# Patient Record
Sex: MALE | Race: WHITE | NOT HISPANIC OR LATINO
[De-identification: names, ages, dates, MRNs, and addresses within clinical notes are randomized per-mention and may not be internally consistent; named-entity substitution may affect disease eponyms.]

---

## 2018-02-06 ENCOUNTER — APPOINTMENT (OUTPATIENT)
Dept: NEUROSURGERY | Facility: CLINIC | Age: 58
End: 2018-02-06
Payer: COMMERCIAL

## 2018-02-06 VITALS
HEART RATE: 69 BPM | HEIGHT: 72 IN | BODY MASS INDEX: 30.88 KG/M2 | DIASTOLIC BLOOD PRESSURE: 85 MMHG | SYSTOLIC BLOOD PRESSURE: 129 MMHG | WEIGHT: 228 LBS

## 2018-02-06 DIAGNOSIS — M54.5 LOW BACK PAIN: ICD-10-CM

## 2018-02-06 DIAGNOSIS — E78.5 HYPERLIPIDEMIA, UNSPECIFIED: ICD-10-CM

## 2018-02-06 DIAGNOSIS — G89.29 LOW BACK PAIN: ICD-10-CM

## 2018-02-06 PROCEDURE — 99204 OFFICE O/P NEW MOD 45 MIN: CPT

## 2018-02-06 RX ORDER — AMLODIPINE BESYLATE 10 MG/1
10 TABLET ORAL DAILY
Refills: 0 | Status: ACTIVE | COMMUNITY

## 2018-02-06 RX ORDER — OLMESARTAN MEDOXOMIL 40 MG/1
40 TABLET, FILM COATED ORAL DAILY
Refills: 0 | Status: ACTIVE | COMMUNITY

## 2018-02-06 RX ORDER — DULOXETINE HYDROCHLORIDE 30 MG/1
30 CAPSULE, DELAYED RELEASE ORAL DAILY
Refills: 0 | Status: ACTIVE | COMMUNITY

## 2018-02-15 ENCOUNTER — OUTPATIENT (OUTPATIENT)
Dept: OUTPATIENT SERVICES | Facility: HOSPITAL | Age: 58
LOS: 1 days | End: 2018-02-15
Payer: COMMERCIAL

## 2018-02-15 VITALS
DIASTOLIC BLOOD PRESSURE: 86 MMHG | HEART RATE: 71 BPM | WEIGHT: 222.01 LBS | RESPIRATION RATE: 16 BRPM | TEMPERATURE: 98 F | HEIGHT: 73 IN | SYSTOLIC BLOOD PRESSURE: 133 MMHG | OXYGEN SATURATION: 98 %

## 2018-02-15 DIAGNOSIS — I10 ESSENTIAL (PRIMARY) HYPERTENSION: ICD-10-CM

## 2018-02-15 DIAGNOSIS — M54.9 DORSALGIA, UNSPECIFIED: ICD-10-CM

## 2018-02-15 DIAGNOSIS — M54.5 LOW BACK PAIN: ICD-10-CM

## 2018-02-15 DIAGNOSIS — Z01.818 ENCOUNTER FOR OTHER PREPROCEDURAL EXAMINATION: ICD-10-CM

## 2018-02-15 DIAGNOSIS — Z98.890 OTHER SPECIFIED POSTPROCEDURAL STATES: Chronic | ICD-10-CM

## 2018-02-15 DIAGNOSIS — D36.10 BENIGN NEOPLASM OF PERIPHERAL NERVES AND AUTONOMIC NERVOUS SYSTEM, UNSPECIFIED: Chronic | ICD-10-CM

## 2018-02-15 LAB
ANION GAP SERPL CALC-SCNC: 15 MMOL/L — SIGNIFICANT CHANGE UP (ref 5–17)
BUN SERPL-MCNC: 21 MG/DL — SIGNIFICANT CHANGE UP (ref 7–23)
CALCIUM SERPL-MCNC: 9.7 MG/DL — SIGNIFICANT CHANGE UP (ref 8.4–10.5)
CHLORIDE SERPL-SCNC: 102 MMOL/L — SIGNIFICANT CHANGE UP (ref 96–108)
CO2 SERPL-SCNC: 23 MMOL/L — SIGNIFICANT CHANGE UP (ref 22–31)
CREAT SERPL-MCNC: 1.11 MG/DL — SIGNIFICANT CHANGE UP (ref 0.5–1.3)
GLUCOSE SERPL-MCNC: 103 MG/DL — HIGH (ref 70–99)
MRSA PCR RESULT.: SIGNIFICANT CHANGE UP
POTASSIUM SERPL-MCNC: 4.2 MMOL/L — SIGNIFICANT CHANGE UP (ref 3.5–5.3)
POTASSIUM SERPL-SCNC: 4.2 MMOL/L — SIGNIFICANT CHANGE UP (ref 3.5–5.3)
S AUREUS DNA NOSE QL NAA+PROBE: SIGNIFICANT CHANGE UP
SODIUM SERPL-SCNC: 140 MMOL/L — SIGNIFICANT CHANGE UP (ref 135–145)

## 2018-02-15 PROCEDURE — G0463: CPT

## 2018-02-15 PROCEDURE — 87640 STAPH A DNA AMP PROBE: CPT

## 2018-02-15 PROCEDURE — 87641 MR-STAPH DNA AMP PROBE: CPT

## 2018-02-15 PROCEDURE — 80048 BASIC METABOLIC PNL TOTAL CA: CPT

## 2018-02-15 RX ORDER — SODIUM CHLORIDE 9 MG/ML
3 INJECTION INTRAMUSCULAR; INTRAVENOUS; SUBCUTANEOUS EVERY 8 HOURS
Qty: 0 | Refills: 0 | Status: DISCONTINUED | OUTPATIENT
Start: 2018-02-23 | End: 2018-02-23

## 2018-02-15 RX ORDER — CEFAZOLIN SODIUM 1 G
2000 VIAL (EA) INJECTION ONCE
Qty: 0 | Refills: 0 | Status: DISCONTINUED | OUTPATIENT
Start: 2018-02-23 | End: 2018-02-24

## 2018-02-15 NOTE — H&P PST ADULT - PSH
History of lumbar laminectomy    History of tonsillectomy    Neuroma  foot removal History of lumbar laminectomy    History of tonsillectomy    Neuroma  foot removal  Status post hip surgery

## 2018-02-15 NOTE — H&P PST ADULT - PROBLEM SELECTOR PLAN 1
planned for Thoracic laminectomy for spinal cord stimulator, pulse generator placement on 2/23/18. ( awaiting clarification from Dr. Rdz's office)  BMP, MRSA send  preprocedure surgical scrub instructions discussed

## 2018-02-15 NOTE — H&P PST ADULT - HISTORY OF PRESENT ILLNESS
57 year old male with PMH of HTN, GERD, chronic lower back pain to left buttocks s/p lumbar laminectomy in the past planned for Thoracic laminectomy for spinal cord stimulator, pulse generator placement on 2/23/18. ( According to patient he is having spinal cord stimulator placement only not laminectomy, called spoke to Vandana at Dr. Rdz's office, she will follow up regarding the procedure clarification and Pending thoracic MRI and will notify PST and patient).

## 2018-02-15 NOTE — H&P PST ADULT - PMH
Essential hypertension    Gastroesophageal reflux disease, esophagitis presence not specified Chronic lower back pain    Essential hypertension    Gastroesophageal reflux disease, esophagitis presence not specified    Spinal stenosis of lumbar region

## 2018-02-16 ENCOUNTER — OTHER (OUTPATIENT)
Age: 58
End: 2018-02-16

## 2018-02-17 ENCOUNTER — FORM ENCOUNTER (OUTPATIENT)
Age: 58
End: 2018-02-17

## 2018-02-18 ENCOUNTER — OUTPATIENT (OUTPATIENT)
Dept: OUTPATIENT SERVICES | Facility: HOSPITAL | Age: 58
LOS: 1 days | End: 2018-02-18
Payer: COMMERCIAL

## 2018-02-18 ENCOUNTER — APPOINTMENT (OUTPATIENT)
Dept: MRI IMAGING | Facility: CLINIC | Age: 58
End: 2018-02-18
Payer: COMMERCIAL

## 2018-02-18 DIAGNOSIS — Z98.890 OTHER SPECIFIED POSTPROCEDURAL STATES: Chronic | ICD-10-CM

## 2018-02-18 DIAGNOSIS — D36.10 BENIGN NEOPLASM OF PERIPHERAL NERVES AND AUTONOMIC NERVOUS SYSTEM, UNSPECIFIED: Chronic | ICD-10-CM

## 2018-02-18 DIAGNOSIS — M54.9 DORSALGIA, UNSPECIFIED: ICD-10-CM

## 2018-02-18 PROCEDURE — 72146 MRI CHEST SPINE W/O DYE: CPT

## 2018-02-18 PROCEDURE — 72146 MRI CHEST SPINE W/O DYE: CPT | Mod: 26

## 2018-02-23 ENCOUNTER — TRANSCRIPTION ENCOUNTER (OUTPATIENT)
Age: 58
End: 2018-02-23

## 2018-02-23 ENCOUNTER — INPATIENT (INPATIENT)
Facility: HOSPITAL | Age: 58
LOS: 1 days | Discharge: HOME | DRG: 518 | End: 2018-02-25
Attending: STUDENT IN AN ORGANIZED HEALTH CARE EDUCATION/TRAINING PROGRAM | Admitting: STUDENT IN AN ORGANIZED HEALTH CARE EDUCATION/TRAINING PROGRAM
Payer: COMMERCIAL

## 2018-02-23 VITALS
SYSTOLIC BLOOD PRESSURE: 136 MMHG | WEIGHT: 214.95 LBS | OXYGEN SATURATION: 97 % | TEMPERATURE: 98 F | RESPIRATION RATE: 18 BRPM | HEIGHT: 73 IN | DIASTOLIC BLOOD PRESSURE: 87 MMHG | HEART RATE: 76 BPM

## 2018-02-23 DIAGNOSIS — Z98.890 OTHER SPECIFIED POSTPROCEDURAL STATES: Chronic | ICD-10-CM

## 2018-02-23 DIAGNOSIS — D36.10 BENIGN NEOPLASM OF PERIPHERAL NERVES AND AUTONOMIC NERVOUS SYSTEM, UNSPECIFIED: Chronic | ICD-10-CM

## 2018-02-23 DIAGNOSIS — M54.9 DORSALGIA, UNSPECIFIED: ICD-10-CM

## 2018-02-23 PROCEDURE — 63655 IMPLANT NEUROELECTRODES: CPT

## 2018-02-23 PROCEDURE — 72131 CT LUMBAR SPINE W/O DYE: CPT | Mod: 26

## 2018-02-23 PROCEDURE — 63685 INS/RPLC SPI NPG/RCVR POCKET: CPT

## 2018-02-23 PROCEDURE — 72128 CT CHEST SPINE W/O DYE: CPT | Mod: 26

## 2018-02-23 RX ORDER — DULOXETINE HYDROCHLORIDE 30 MG/1
1 CAPSULE, DELAYED RELEASE ORAL
Qty: 0 | Refills: 0 | COMMUNITY

## 2018-02-23 RX ORDER — HYDROMORPHONE HYDROCHLORIDE 2 MG/ML
0.5 INJECTION INTRAMUSCULAR; INTRAVENOUS; SUBCUTANEOUS
Qty: 0 | Refills: 0 | Status: DISCONTINUED | OUTPATIENT
Start: 2018-02-23 | End: 2018-02-23

## 2018-02-23 RX ORDER — LOSARTAN POTASSIUM 100 MG/1
50 TABLET, FILM COATED ORAL DAILY
Qty: 0 | Refills: 0 | Status: DISCONTINUED | OUTPATIENT
Start: 2018-02-23 | End: 2018-02-25

## 2018-02-23 RX ORDER — ONDANSETRON 8 MG/1
4 TABLET, FILM COATED ORAL ONCE
Qty: 0 | Refills: 0 | Status: DISCONTINUED | OUTPATIENT
Start: 2018-02-23 | End: 2018-02-23

## 2018-02-23 RX ORDER — DIAZEPAM 5 MG
5 TABLET ORAL ONCE
Qty: 0 | Refills: 0 | Status: DISCONTINUED | OUTPATIENT
Start: 2018-02-23 | End: 2018-02-23

## 2018-02-23 RX ORDER — HYDROMORPHONE HYDROCHLORIDE 2 MG/ML
0.5 INJECTION INTRAMUSCULAR; INTRAVENOUS; SUBCUTANEOUS
Qty: 0 | Refills: 0 | Status: DISCONTINUED | OUTPATIENT
Start: 2018-02-23 | End: 2018-02-24

## 2018-02-23 RX ORDER — SODIUM CHLORIDE 9 MG/ML
1000 INJECTION, SOLUTION INTRAVENOUS
Qty: 0 | Refills: 0 | Status: DISCONTINUED | OUTPATIENT
Start: 2018-02-23 | End: 2018-02-24

## 2018-02-23 RX ORDER — DEXMEDETOMIDINE HYDROCHLORIDE IN 0.9% SODIUM CHLORIDE 4 UG/ML
0.7 INJECTION INTRAVENOUS
Qty: 200 | Refills: 0 | Status: DISCONTINUED | OUTPATIENT
Start: 2018-02-23 | End: 2018-02-24

## 2018-02-23 RX ORDER — NALOXONE HYDROCHLORIDE 4 MG/.1ML
0.1 SPRAY NASAL
Qty: 0 | Refills: 0 | Status: DISCONTINUED | OUTPATIENT
Start: 2018-02-23 | End: 2018-02-25

## 2018-02-23 RX ORDER — ACETAMINOPHEN 500 MG
1000 TABLET ORAL ONCE
Qty: 0 | Refills: 0 | Status: COMPLETED | OUTPATIENT
Start: 2018-02-23 | End: 2018-02-23

## 2018-02-23 RX ORDER — OXYCODONE HYDROCHLORIDE 5 MG/1
10 TABLET ORAL ONCE
Qty: 0 | Refills: 0 | Status: DISCONTINUED | OUTPATIENT
Start: 2018-02-23 | End: 2018-02-23

## 2018-02-23 RX ORDER — HYDROMORPHONE HYDROCHLORIDE 2 MG/ML
30 INJECTION INTRAMUSCULAR; INTRAVENOUS; SUBCUTANEOUS
Qty: 0 | Refills: 0 | Status: DISCONTINUED | OUTPATIENT
Start: 2018-02-23 | End: 2018-02-24

## 2018-02-23 RX ORDER — DEXAMETHASONE 0.5 MG/5ML
4 ELIXIR ORAL ONCE
Qty: 0 | Refills: 0 | Status: DISCONTINUED | OUTPATIENT
Start: 2018-02-23 | End: 2018-02-24

## 2018-02-23 RX ORDER — HYDROCHLOROTHIAZIDE 25 MG
12.5 TABLET ORAL DAILY
Qty: 0 | Refills: 0 | Status: DISCONTINUED | OUTPATIENT
Start: 2018-02-23 | End: 2018-02-25

## 2018-02-23 RX ORDER — DULOXETINE HYDROCHLORIDE 30 MG/1
30 CAPSULE, DELAYED RELEASE ORAL DAILY
Qty: 0 | Refills: 0 | Status: DISCONTINUED | OUTPATIENT
Start: 2018-02-23 | End: 2018-02-24

## 2018-02-23 RX ORDER — DEXAMETHASONE 0.5 MG/5ML
4 ELIXIR ORAL ONCE
Qty: 0 | Refills: 0 | Status: DISCONTINUED | OUTPATIENT
Start: 2018-02-23 | End: 2018-02-23

## 2018-02-23 RX ORDER — MORPHINE SULFATE 50 MG/1
4 CAPSULE, EXTENDED RELEASE ORAL ONCE
Qty: 0 | Refills: 0 | Status: DISCONTINUED | OUTPATIENT
Start: 2018-02-23 | End: 2018-02-23

## 2018-02-23 RX ORDER — AMLODIPINE BESYLATE 2.5 MG/1
10 TABLET ORAL DAILY
Qty: 0 | Refills: 0 | Status: DISCONTINUED | OUTPATIENT
Start: 2018-02-23 | End: 2018-02-25

## 2018-02-23 RX ORDER — OLMESARTAN MEDOXOMIL 5 MG/1
1 TABLET, FILM COATED ORAL
Qty: 0 | Refills: 0 | COMMUNITY

## 2018-02-23 RX ORDER — CYCLOBENZAPRINE HYDROCHLORIDE 10 MG/1
5 TABLET, FILM COATED ORAL ONCE
Qty: 0 | Refills: 0 | Status: COMPLETED | OUTPATIENT
Start: 2018-02-23 | End: 2018-02-23

## 2018-02-23 RX ORDER — AMLODIPINE BESYLATE 2.5 MG/1
1 TABLET ORAL
Qty: 0 | Refills: 0 | COMMUNITY

## 2018-02-23 RX ORDER — PANTOPRAZOLE SODIUM 20 MG/1
40 TABLET, DELAYED RELEASE ORAL
Qty: 0 | Refills: 0 | Status: DISCONTINUED | OUTPATIENT
Start: 2018-02-23 | End: 2018-02-25

## 2018-02-23 RX ORDER — OMEPRAZOLE 10 MG/1
1 CAPSULE, DELAYED RELEASE ORAL
Qty: 0 | Refills: 0 | COMMUNITY

## 2018-02-23 RX ORDER — DIAZEPAM 5 MG
2 TABLET ORAL ONCE
Qty: 0 | Refills: 0 | Status: DISCONTINUED | OUTPATIENT
Start: 2018-02-23 | End: 2018-02-23

## 2018-02-23 RX ORDER — LIDOCAINE HCL 20 MG/ML
0.2 VIAL (ML) INJECTION ONCE
Qty: 0 | Refills: 0 | Status: COMPLETED | OUTPATIENT
Start: 2018-02-23 | End: 2018-02-23

## 2018-02-23 RX ORDER — TRAMADOL HYDROCHLORIDE 50 MG/1
50 TABLET ORAL
Qty: 0 | Refills: 0 | Status: DISCONTINUED | OUTPATIENT
Start: 2018-02-23 | End: 2018-02-25

## 2018-02-23 RX ADMIN — HYDROMORPHONE HYDROCHLORIDE 0.5 MILLIGRAM(S): 2 INJECTION INTRAMUSCULAR; INTRAVENOUS; SUBCUTANEOUS at 11:16

## 2018-02-23 RX ADMIN — MORPHINE SULFATE 4 MILLIGRAM(S): 50 CAPSULE, EXTENDED RELEASE ORAL at 12:06

## 2018-02-23 RX ADMIN — HYDROMORPHONE HYDROCHLORIDE 0.5 MILLIGRAM(S): 2 INJECTION INTRAMUSCULAR; INTRAVENOUS; SUBCUTANEOUS at 13:47

## 2018-02-23 RX ADMIN — SODIUM CHLORIDE 3 MILLILITER(S): 9 INJECTION INTRAMUSCULAR; INTRAVENOUS; SUBCUTANEOUS at 06:17

## 2018-02-23 RX ADMIN — SODIUM CHLORIDE 75 MILLILITER(S): 9 INJECTION, SOLUTION INTRAVENOUS at 11:15

## 2018-02-23 RX ADMIN — HYDROMORPHONE HYDROCHLORIDE 0.5 MILLIGRAM(S): 2 INJECTION INTRAMUSCULAR; INTRAVENOUS; SUBCUTANEOUS at 12:17

## 2018-02-23 RX ADMIN — Medication 400 MILLIGRAM(S): at 17:19

## 2018-02-23 RX ADMIN — Medication 2 MILLIGRAM(S): at 11:09

## 2018-02-23 RX ADMIN — HYDROMORPHONE HYDROCHLORIDE 0.5 MILLIGRAM(S): 2 INJECTION INTRAMUSCULAR; INTRAVENOUS; SUBCUTANEOUS at 11:15

## 2018-02-23 RX ADMIN — HYDROMORPHONE HYDROCHLORIDE 30 MILLILITER(S): 2 INJECTION INTRAMUSCULAR; INTRAVENOUS; SUBCUTANEOUS at 14:30

## 2018-02-23 RX ADMIN — DEXMEDETOMIDINE HYDROCHLORIDE IN 0.9% SODIUM CHLORIDE 17.06 MICROGRAM(S)/KG/HR: 4 INJECTION INTRAVENOUS at 14:00

## 2018-02-23 RX ADMIN — HYDROMORPHONE HYDROCHLORIDE 0.5 MILLIGRAM(S): 2 INJECTION INTRAMUSCULAR; INTRAVENOUS; SUBCUTANEOUS at 12:07

## 2018-02-23 RX ADMIN — Medication 1000 MILLIGRAM(S): at 17:45

## 2018-02-23 RX ADMIN — HYDROMORPHONE HYDROCHLORIDE 0.5 MILLIGRAM(S): 2 INJECTION INTRAMUSCULAR; INTRAVENOUS; SUBCUTANEOUS at 11:50

## 2018-02-23 RX ADMIN — Medication 0.2 MILLILITER(S): at 06:17

## 2018-02-23 RX ADMIN — HYDROMORPHONE HYDROCHLORIDE 0.5 MILLIGRAM(S): 2 INJECTION INTRAMUSCULAR; INTRAVENOUS; SUBCUTANEOUS at 11:30

## 2018-02-23 RX ADMIN — MORPHINE SULFATE 4 MILLIGRAM(S): 50 CAPSULE, EXTENDED RELEASE ORAL at 11:56

## 2018-02-23 RX ADMIN — OXYCODONE HYDROCHLORIDE 10 MILLIGRAM(S): 5 TABLET ORAL at 12:23

## 2018-02-23 RX ADMIN — CYCLOBENZAPRINE HYDROCHLORIDE 5 MILLIGRAM(S): 10 TABLET, FILM COATED ORAL at 15:10

## 2018-02-23 RX ADMIN — Medication 5 MILLIGRAM(S): at 13:27

## 2018-02-23 RX ADMIN — HYDROMORPHONE HYDROCHLORIDE 30 MILLILITER(S): 2 INJECTION INTRAMUSCULAR; INTRAVENOUS; SUBCUTANEOUS at 20:02

## 2018-02-23 RX ADMIN — TRAMADOL HYDROCHLORIDE 50 MILLIGRAM(S): 50 TABLET ORAL at 12:45

## 2018-02-23 RX ADMIN — OXYCODONE HYDROCHLORIDE 10 MILLIGRAM(S): 5 TABLET ORAL at 12:45

## 2018-02-23 RX ADMIN — TRAMADOL HYDROCHLORIDE 50 MILLIGRAM(S): 50 TABLET ORAL at 11:43

## 2018-02-23 RX ADMIN — HYDROMORPHONE HYDROCHLORIDE 0.5 MILLIGRAM(S): 2 INJECTION INTRAMUSCULAR; INTRAVENOUS; SUBCUTANEOUS at 11:37

## 2018-02-23 RX ADMIN — DULOXETINE HYDROCHLORIDE 30 MILLIGRAM(S): 30 CAPSULE, DELAYED RELEASE ORAL at 16:29

## 2018-02-23 NOTE — ASU DISCHARGE PLAN (ADULT/PEDIATRIC). - NOTIFY
Numbness, color, or temperature change to extremity/Bleeding that does not stop/Pain not relieved by Medications/Fever greater than 101/Swelling that continues/Persistent Nausea and Vomiting/Unable to Urinate

## 2018-02-23 NOTE — ASU DISCHARGE PLAN (ADULT/PEDIATRIC). - INSTRUCTIONS
No strenuous activity. No heavy lifting.  You may shower on the first day after you surgery.  No soaking in tub,  You may remove dressing on post op day one. Do not apply any ointments to incision.

## 2018-02-23 NOTE — ASU DISCHARGE PLAN (ADULT/PEDIATRIC). - ITEMS TO FOLLOWUP WITH YOUR PHYSICIAN'S
Call Neurosurgery Dr Rdz for appointment in 1 week for wound check.  Followup with your Private MD in 1-2 weeks.  Return to Emergency Department or contact your Neurosurgeon if any changes in mental status, weakness, numbness or tingling of extremities; difficulty swallowing; drainage or redness of wound, fever; pain in legs; difficulty urinating or constipation.  Donot restart your Aspirin or take any Motrin/NSAIDS until checking with your Neurosurgeon.

## 2018-02-23 NOTE — BRIEF OPERATIVE NOTE - PROCEDURE
<<-----Click on this checkbox to enter Procedure Spinal cord stimulator replacement  02/23/2018    Active  CHRIS

## 2018-02-23 NOTE — PATIENT PROFILE ADULT. - PSH
History of lumbar laminectomy    History of tonsillectomy    Neuroma  foot removal  Status post hip surgery

## 2018-02-23 NOTE — PRE-OP CHECKLIST - VIA
Please follow up as instructed. If you require a blood test for your next appointment please complete 1 week prior to your scheduled appointment.    If you require any imaging study such as a MRI of Prostate, CT scan or Renal Ultrasound complete please call our centralized scheduling department at [phone].    If you require a plain x-ray, KUB (Kidney ureter bladder), NO appointment is required. You should arrive 30 minutes prior to your scheduled time to have this completed in our imaging department.    If you have any questions or concerns regarding your appointment today or your future appointment please contact our office at [phone]. Monday-Friday 8am-5pm    After hours for emergency please contact [phone] and ask to have on call urologist paged.     stretcher

## 2018-02-23 NOTE — ASU DISCHARGE PLAN (ADULT/PEDIATRIC). - MEDICATION SUMMARY - MEDICATIONS TO TAKE
I will START or STAY ON the medications listed below when I get home from the hospital:    traMADol 50 mg oral tablet  -- orally 2 times a day, As Needed  -- Indication: For Chronic lower back pain    olmesartan 40 mg oral tablet  -- 1 tab(s) by mouth once a day  -- Indication: For HTN     Cymbalta 30 mg oral delayed release capsule  -- 1 cap(s) by mouth once a day  -- Indication: For Depression     pravastatin 20 mg oral tablet  -- 1 tab(s) by mouth once a day  -- Indication: For Hyperlipidemia     amLODIPine 10 mg oral tablet  -- 1 tab(s) by mouth once a day  -- Indication: For Hypertension    hydroCHLOROthiazide 12.5 mg oral capsule  -- 1 cap(s) by mouth once a day  -- Indication: For Hypertension    omeprazole 20 mg oral delayed release tablet  -- 1 tab(s) by mouth once a day  -- Indication: For GERD

## 2018-02-23 NOTE — PATIENT PROFILE ADULT. - PMH
Chronic lower back pain    Essential hypertension    Gastroesophageal reflux disease, esophagitis presence not specified    Spinal stenosis of lumbar region

## 2018-02-23 NOTE — CHART NOTE - NSCHARTNOTEFT_GEN_A_CORE
Called to beside, Pt c/o of severe uper thigh pain " The worst he ever had. Neuro exam show no deficits in strength, Given Valium 2mg PO times one. Neurosurg service saw pt , Dr Rdz at bedside ~1125, pt received Dilaudid 0.5mg IVP times 5, Morphine sulfate 4mg Times 1, Neuro surg order Oxycodone times 10 Dr Rdz at bedside again to assess pt. D/W him starting PCA. Pt states only takes tramadol 50 mg that he cuts in half. PCA ordered @1315. Pt wild and sttes he is is in agony that is unrelenting. D/W Dr Bell, DR Rdz and Dr Guzman. Pt started on Precedex gtt @0.7. Pt calmer by 1430. with " Some improvement" Dr Rdz at bedside. Pt appears to have severe quadrcepts spasm. Ordered flexeril 5mg Stat after discussing with DR Rdz and Arabella. Precedex D/C'd at  1500.  By 1530 patient pain improved. Pt ordered for CT of lumbar spine by Neurosurg service. Pt pain now 2 or 3 per patient and feels much better. Pt to CT scan @ 1810. Neurosurg service aware and are primarily following patient.

## 2018-02-24 RX ORDER — ACETAMINOPHEN 500 MG
1000 TABLET ORAL ONCE
Qty: 0 | Refills: 0 | Status: COMPLETED | OUTPATIENT
Start: 2018-02-24 | End: 2018-02-24

## 2018-02-24 RX ORDER — HYDROMORPHONE HYDROCHLORIDE 2 MG/ML
2 INJECTION INTRAMUSCULAR; INTRAVENOUS; SUBCUTANEOUS EVERY 4 HOURS
Qty: 0 | Refills: 0 | Status: DISCONTINUED | OUTPATIENT
Start: 2018-02-24 | End: 2018-02-24

## 2018-02-24 RX ORDER — OXYCODONE AND ACETAMINOPHEN 5; 325 MG/1; MG/1
1 TABLET ORAL EVERY 4 HOURS
Qty: 0 | Refills: 0 | Status: DISCONTINUED | OUTPATIENT
Start: 2018-02-24 | End: 2018-02-24

## 2018-02-24 RX ORDER — KETOROLAC TROMETHAMINE 30 MG/ML
30 SYRINGE (ML) INJECTION EVERY 6 HOURS
Qty: 0 | Refills: 0 | Status: DISCONTINUED | OUTPATIENT
Start: 2018-02-24 | End: 2018-02-25

## 2018-02-24 RX ORDER — DEXAMETHASONE 0.5 MG/5ML
4 ELIXIR ORAL ONCE
Qty: 0 | Refills: 0 | Status: COMPLETED | OUTPATIENT
Start: 2018-02-24 | End: 2018-02-24

## 2018-02-24 RX ORDER — DULOXETINE HYDROCHLORIDE 30 MG/1
30 CAPSULE, DELAYED RELEASE ORAL
Qty: 0 | Refills: 0 | Status: DISCONTINUED | OUTPATIENT
Start: 2018-02-24 | End: 2018-02-25

## 2018-02-24 RX ORDER — CYCLOBENZAPRINE HYDROCHLORIDE 10 MG/1
5 TABLET, FILM COATED ORAL THREE TIMES A DAY
Qty: 0 | Refills: 0 | Status: DISCONTINUED | OUTPATIENT
Start: 2018-02-24 | End: 2018-02-24

## 2018-02-24 RX ORDER — ACETAMINOPHEN 500 MG
1000 TABLET ORAL ONCE
Qty: 0 | Refills: 0 | Status: DISCONTINUED | OUTPATIENT
Start: 2018-02-24 | End: 2018-02-24

## 2018-02-24 RX ORDER — KETOROLAC TROMETHAMINE 30 MG/ML
30 SYRINGE (ML) INJECTION ONCE
Qty: 0 | Refills: 0 | Status: DISCONTINUED | OUTPATIENT
Start: 2018-02-24 | End: 2018-02-24

## 2018-02-24 RX ORDER — CYCLOBENZAPRINE HYDROCHLORIDE 10 MG/1
10 TABLET, FILM COATED ORAL THREE TIMES A DAY
Qty: 0 | Refills: 0 | Status: DISCONTINUED | OUTPATIENT
Start: 2018-02-24 | End: 2018-02-25

## 2018-02-24 RX ORDER — DIAZEPAM 5 MG
5 TABLET ORAL EVERY 6 HOURS
Qty: 0 | Refills: 0 | Status: DISCONTINUED | OUTPATIENT
Start: 2018-02-24 | End: 2018-02-25

## 2018-02-24 RX ORDER — OXYCODONE AND ACETAMINOPHEN 5; 325 MG/1; MG/1
2 TABLET ORAL EVERY 4 HOURS
Qty: 0 | Refills: 0 | Status: DISCONTINUED | OUTPATIENT
Start: 2018-02-24 | End: 2018-02-25

## 2018-02-24 RX ORDER — DEXAMETHASONE 0.5 MG/5ML
6 ELIXIR ORAL EVERY 6 HOURS
Qty: 0 | Refills: 0 | Status: DISCONTINUED | OUTPATIENT
Start: 2018-02-24 | End: 2018-02-25

## 2018-02-24 RX ORDER — HYDROMORPHONE HYDROCHLORIDE 2 MG/ML
1 INJECTION INTRAMUSCULAR; INTRAVENOUS; SUBCUTANEOUS
Qty: 0 | Refills: 0 | Status: DISCONTINUED | OUTPATIENT
Start: 2018-02-24 | End: 2018-02-24

## 2018-02-24 RX ORDER — DIAZEPAM 5 MG
5 TABLET ORAL ONCE
Qty: 0 | Refills: 0 | Status: DISCONTINUED | OUTPATIENT
Start: 2018-02-24 | End: 2018-02-24

## 2018-02-24 RX ORDER — INFLUENZA VIRUS VACCINE 15; 15; 15; 15 UG/.5ML; UG/.5ML; UG/.5ML; UG/.5ML
0.5 SUSPENSION INTRAMUSCULAR ONCE
Qty: 0 | Refills: 0 | Status: DISCONTINUED | OUTPATIENT
Start: 2018-02-24 | End: 2018-02-25

## 2018-02-24 RX ADMIN — OXYCODONE AND ACETAMINOPHEN 1 TABLET(S): 5; 325 TABLET ORAL at 09:48

## 2018-02-24 RX ADMIN — Medication 30 MILLIGRAM(S): at 20:46

## 2018-02-24 RX ADMIN — TRAMADOL HYDROCHLORIDE 50 MILLIGRAM(S): 50 TABLET ORAL at 15:30

## 2018-02-24 RX ADMIN — OXYCODONE AND ACETAMINOPHEN 2 TABLET(S): 5; 325 TABLET ORAL at 19:42

## 2018-02-24 RX ADMIN — Medication 5 MILLIGRAM(S): at 17:25

## 2018-02-24 RX ADMIN — OXYCODONE AND ACETAMINOPHEN 2 TABLET(S): 5; 325 TABLET ORAL at 23:28

## 2018-02-24 RX ADMIN — CYCLOBENZAPRINE HYDROCHLORIDE 5 MILLIGRAM(S): 10 TABLET, FILM COATED ORAL at 01:40

## 2018-02-24 RX ADMIN — HYDROMORPHONE HYDROCHLORIDE 1 MILLIGRAM(S): 2 INJECTION INTRAMUSCULAR; INTRAVENOUS; SUBCUTANEOUS at 22:04

## 2018-02-24 RX ADMIN — OXYCODONE AND ACETAMINOPHEN 2 TABLET(S): 5; 325 TABLET ORAL at 15:27

## 2018-02-24 RX ADMIN — CYCLOBENZAPRINE HYDROCHLORIDE 10 MILLIGRAM(S): 10 TABLET, FILM COATED ORAL at 13:57

## 2018-02-24 RX ADMIN — Medication 1000 MILLIGRAM(S): at 02:37

## 2018-02-24 RX ADMIN — SODIUM CHLORIDE 75 MILLILITER(S): 9 INJECTION, SOLUTION INTRAVENOUS at 06:21

## 2018-02-24 RX ADMIN — Medication 12.5 MILLIGRAM(S): at 06:23

## 2018-02-24 RX ADMIN — Medication 400 MILLIGRAM(S): at 10:50

## 2018-02-24 RX ADMIN — Medication 6 MILLIGRAM(S): at 17:26

## 2018-02-24 RX ADMIN — Medication 6 MILLIGRAM(S): at 23:28

## 2018-02-24 RX ADMIN — Medication 4 MILLIGRAM(S): at 09:47

## 2018-02-24 RX ADMIN — Medication 400 MILLIGRAM(S): at 02:27

## 2018-02-24 RX ADMIN — HYDROMORPHONE HYDROCHLORIDE 1 MILLIGRAM(S): 2 INJECTION INTRAMUSCULAR; INTRAVENOUS; SUBCUTANEOUS at 22:25

## 2018-02-24 RX ADMIN — LOSARTAN POTASSIUM 50 MILLIGRAM(S): 100 TABLET, FILM COATED ORAL at 06:23

## 2018-02-24 RX ADMIN — AMLODIPINE BESYLATE 10 MILLIGRAM(S): 2.5 TABLET ORAL at 06:26

## 2018-02-24 RX ADMIN — HYDROMORPHONE HYDROCHLORIDE 30 MILLILITER(S): 2 INJECTION INTRAMUSCULAR; INTRAVENOUS; SUBCUTANEOUS at 07:47

## 2018-02-24 RX ADMIN — OXYCODONE AND ACETAMINOPHEN 2 TABLET(S): 5; 325 TABLET ORAL at 16:30

## 2018-02-24 RX ADMIN — Medication 30 MILLIGRAM(S): at 16:00

## 2018-02-24 RX ADMIN — HYDROMORPHONE HYDROCHLORIDE 1 MILLIGRAM(S): 2 INJECTION INTRAMUSCULAR; INTRAVENOUS; SUBCUTANEOUS at 20:11

## 2018-02-24 RX ADMIN — Medication 5 MILLIGRAM(S): at 01:11

## 2018-02-24 RX ADMIN — DULOXETINE HYDROCHLORIDE 30 MILLIGRAM(S): 30 CAPSULE, DELAYED RELEASE ORAL at 22:05

## 2018-02-24 RX ADMIN — OXYCODONE AND ACETAMINOPHEN 1 TABLET(S): 5; 325 TABLET ORAL at 10:30

## 2018-02-24 RX ADMIN — OXYCODONE AND ACETAMINOPHEN 2 TABLET(S): 5; 325 TABLET ORAL at 20:11

## 2018-02-24 RX ADMIN — PANTOPRAZOLE SODIUM 40 MILLIGRAM(S): 20 TABLET, DELAYED RELEASE ORAL at 06:23

## 2018-02-24 RX ADMIN — Medication 5 MILLIGRAM(S): at 09:48

## 2018-02-24 RX ADMIN — Medication 30 MILLIGRAM(S): at 15:22

## 2018-02-24 RX ADMIN — Medication 30 MILLIGRAM(S): at 21:15

## 2018-02-24 RX ADMIN — TRAMADOL HYDROCHLORIDE 50 MILLIGRAM(S): 50 TABLET ORAL at 14:39

## 2018-02-24 RX ADMIN — HYDROMORPHONE HYDROCHLORIDE 1 MILLIGRAM(S): 2 INJECTION INTRAMUSCULAR; INTRAVENOUS; SUBCUTANEOUS at 20:30

## 2018-02-24 NOTE — PROVIDER CONTACT NOTE (MEDICATION) - ACTION/TREATMENT ORDERED:
Multiple adjustments made with Pain meds with good effect, temporary relief at times.  Will continue to monitor.  PP/RN

## 2018-02-24 NOTE — PHYSICAL THERAPY INITIAL EVALUATION ADULT - PERTINENT HX OF CURRENT PROBLEM, REHAB EVAL
Pt is a 56yo M presents as POD 1 s/p spinal cord stimulator placement.  pt with complaints of severe L LE pain post op.

## 2018-02-25 ENCOUNTER — TRANSCRIPTION ENCOUNTER (OUTPATIENT)
Age: 58
End: 2018-02-25

## 2018-02-25 VITALS
TEMPERATURE: 98 F | RESPIRATION RATE: 18 BRPM | SYSTOLIC BLOOD PRESSURE: 122 MMHG | HEART RATE: 80 BPM | DIASTOLIC BLOOD PRESSURE: 73 MMHG | OXYGEN SATURATION: 95 %

## 2018-02-25 LAB
ANION GAP SERPL CALC-SCNC: 14 MMOL/L — SIGNIFICANT CHANGE UP (ref 5–17)
BUN SERPL-MCNC: 20 MG/DL — SIGNIFICANT CHANGE UP (ref 7–23)
CALCIUM SERPL-MCNC: 9.7 MG/DL — SIGNIFICANT CHANGE UP (ref 8.4–10.5)
CHLORIDE SERPL-SCNC: 99 MMOL/L — SIGNIFICANT CHANGE UP (ref 96–108)
CK SERPL-CCNC: 235 U/L — HIGH (ref 30–200)
CO2 SERPL-SCNC: 23 MMOL/L — SIGNIFICANT CHANGE UP (ref 22–31)
CREAT SERPL-MCNC: 1.25 MG/DL — SIGNIFICANT CHANGE UP (ref 0.5–1.3)
GLUCOSE SERPL-MCNC: 175 MG/DL — HIGH (ref 70–99)
HCT VFR BLD CALC: 43.9 % — SIGNIFICANT CHANGE UP (ref 39–50)
HGB BLD-MCNC: 15.3 G/DL — SIGNIFICANT CHANGE UP (ref 13–17)
MCHC RBC-ENTMCNC: 31.6 PG — SIGNIFICANT CHANGE UP (ref 27–34)
MCHC RBC-ENTMCNC: 34.8 GM/DL — SIGNIFICANT CHANGE UP (ref 32–36)
MCV RBC AUTO: 90.7 FL — SIGNIFICANT CHANGE UP (ref 80–100)
PLATELET # BLD AUTO: 241 K/UL — SIGNIFICANT CHANGE UP (ref 150–400)
POTASSIUM SERPL-MCNC: 4 MMOL/L — SIGNIFICANT CHANGE UP (ref 3.5–5.3)
POTASSIUM SERPL-SCNC: 4 MMOL/L — SIGNIFICANT CHANGE UP (ref 3.5–5.3)
RBC # BLD: 4.83 M/UL — SIGNIFICANT CHANGE UP (ref 4.2–5.8)
RBC # FLD: 11.7 % — SIGNIFICANT CHANGE UP (ref 10.3–14.5)
SODIUM SERPL-SCNC: 136 MMOL/L — SIGNIFICANT CHANGE UP (ref 135–145)
WBC # BLD: 16.2 K/UL — HIGH (ref 3.8–10.5)
WBC # FLD AUTO: 16.2 K/UL — HIGH (ref 3.8–10.5)

## 2018-02-25 PROCEDURE — 72131 CT LUMBAR SPINE W/O DYE: CPT

## 2018-02-25 PROCEDURE — 85027 COMPLETE CBC AUTOMATED: CPT

## 2018-02-25 PROCEDURE — 76000 FLUOROSCOPY <1 HR PHYS/QHP: CPT

## 2018-02-25 PROCEDURE — 82550 ASSAY OF CK (CPK): CPT

## 2018-02-25 PROCEDURE — C1889: CPT

## 2018-02-25 PROCEDURE — C1822: CPT

## 2018-02-25 PROCEDURE — C1778: CPT

## 2018-02-25 PROCEDURE — 72128 CT CHEST SPINE W/O DYE: CPT

## 2018-02-25 PROCEDURE — 80048 BASIC METABOLIC PNL TOTAL CA: CPT

## 2018-02-25 PROCEDURE — C1787: CPT

## 2018-02-25 RX ORDER — HYDROMORPHONE HYDROCHLORIDE 2 MG/ML
1 INJECTION INTRAMUSCULAR; INTRAVENOUS; SUBCUTANEOUS
Qty: 0 | Refills: 0 | Status: DISCONTINUED | OUTPATIENT
Start: 2018-02-25 | End: 2018-02-25

## 2018-02-25 RX ORDER — LIDOCAINE 4 G/100G
1 CREAM TOPICAL
Qty: 30 | Refills: 0 | OUTPATIENT
Start: 2018-02-25

## 2018-02-25 RX ORDER — TRAMADOL HYDROCHLORIDE 50 MG/1
0 TABLET ORAL
Qty: 0 | Refills: 0 | COMMUNITY

## 2018-02-25 RX ORDER — GABAPENTIN 400 MG/1
1 CAPSULE ORAL
Qty: 90 | Refills: 0 | OUTPATIENT
Start: 2018-02-25 | End: 2018-03-26

## 2018-02-25 RX ORDER — HYDROMORPHONE HYDROCHLORIDE 2 MG/ML
1 INJECTION INTRAMUSCULAR; INTRAVENOUS; SUBCUTANEOUS
Qty: 15 | Refills: 0 | OUTPATIENT
Start: 2018-02-25

## 2018-02-25 RX ADMIN — CYCLOBENZAPRINE HYDROCHLORIDE 10 MILLIGRAM(S): 10 TABLET, FILM COATED ORAL at 05:50

## 2018-02-25 RX ADMIN — AMLODIPINE BESYLATE 10 MILLIGRAM(S): 2.5 TABLET ORAL at 05:50

## 2018-02-25 RX ADMIN — Medication 12.5 MILLIGRAM(S): at 05:50

## 2018-02-25 RX ADMIN — OXYCODONE AND ACETAMINOPHEN 2 TABLET(S): 5; 325 TABLET ORAL at 00:38

## 2018-02-25 RX ADMIN — LOSARTAN POTASSIUM 50 MILLIGRAM(S): 100 TABLET, FILM COATED ORAL at 05:50

## 2018-02-25 RX ADMIN — Medication 30 MILLIGRAM(S): at 02:15

## 2018-02-25 RX ADMIN — Medication 30 MILLIGRAM(S): at 01:42

## 2018-02-25 RX ADMIN — HYDROMORPHONE HYDROCHLORIDE 1 MILLIGRAM(S): 2 INJECTION INTRAMUSCULAR; INTRAVENOUS; SUBCUTANEOUS at 09:00

## 2018-02-25 RX ADMIN — HYDROMORPHONE HYDROCHLORIDE 1 MILLIGRAM(S): 2 INJECTION INTRAMUSCULAR; INTRAVENOUS; SUBCUTANEOUS at 01:38

## 2018-02-25 RX ADMIN — PANTOPRAZOLE SODIUM 40 MILLIGRAM(S): 20 TABLET, DELAYED RELEASE ORAL at 05:51

## 2018-02-25 RX ADMIN — HYDROMORPHONE HYDROCHLORIDE 1 MILLIGRAM(S): 2 INJECTION INTRAMUSCULAR; INTRAVENOUS; SUBCUTANEOUS at 01:53

## 2018-02-25 RX ADMIN — Medication 6 MILLIGRAM(S): at 05:51

## 2018-02-25 RX ADMIN — DULOXETINE HYDROCHLORIDE 30 MILLIGRAM(S): 30 CAPSULE, DELAYED RELEASE ORAL at 05:50

## 2018-02-25 RX ADMIN — OXYCODONE AND ACETAMINOPHEN 2 TABLET(S): 5; 325 TABLET ORAL at 04:00

## 2018-02-25 RX ADMIN — Medication 5 MILLIGRAM(S): at 00:09

## 2018-02-25 RX ADMIN — Medication 30 MILLIGRAM(S): at 06:37

## 2018-02-25 RX ADMIN — HYDROMORPHONE HYDROCHLORIDE 1 MILLIGRAM(S): 2 INJECTION INTRAMUSCULAR; INTRAVENOUS; SUBCUTANEOUS at 08:33

## 2018-02-25 RX ADMIN — HYDROMORPHONE HYDROCHLORIDE 1 MILLIGRAM(S): 2 INJECTION INTRAMUSCULAR; INTRAVENOUS; SUBCUTANEOUS at 05:00

## 2018-02-25 RX ADMIN — OXYCODONE AND ACETAMINOPHEN 2 TABLET(S): 5; 325 TABLET ORAL at 03:36

## 2018-02-25 NOTE — PROGRESS NOTE ADULT - SUBJECTIVE AND OBJECTIVE BOX
Day _1__ of Anesthesia Pain Management Service    SUBJECTIVE:    Pain Scale Score	At rest: _2__ 	With Activity: ___ 	[ ] Refer to charted pain scores    THERAPY:    [ ] IV PCA Morphine		[ ] 5 mg/mL	[ ] 1 mg/mL  [x ] IV PCA Hydromorphone	[ ] 5 mg/mL	[ ] 1 mg/mL  [ ] IV PCA Fentanyl		[ ] 50 micrograms/mL    Demand dose0.25    lockout   6 (minutes) Continuous Rate  0  Total:     Daily      MEDICATIONS  (STANDING):  amLODIPine   Tablet 10 milliGRAM(s) Oral daily  cyclobenzaprine 10 milliGRAM(s) Oral three times a day  DULoxetine 30 milliGRAM(s) Oral daily  hydrochlorothiazide 12.5 milliGRAM(s) Oral daily  losartan 50 milliGRAM(s) Oral daily  pantoprazole    Tablet 40 milliGRAM(s) Oral before breakfast    MEDICATIONS  (PRN):  naloxone Injectable 0.1 milliGRAM(s) IV Push every 3 minutes PRN For ANY of the following changes in patient status:  A. RR LESS THAN 10 breaths per minute, B. Oxygen saturation LESS THAN 90%, C. Sedation score of 6  oxyCODONE    5 mG/acetaminophen 325 mG 1 Tablet(s) Oral every 4 hours PRN Severe Pain (7 - 10)  traMADol 50 milliGRAM(s) Oral two times a day PRN Moderate Pain (4 - 6)      OBJECTIVE:    Sedation Score:	[ x] Alert	[ ] Drowsy 	[ ] Arousable	[ ] Asleep	[ ] Unresponsive    Side Effects:	[x ] None	[ ] Nausea	[ ] Vomiting	[ ] Pruritus  		[ ] Other:    Vital Signs Last 24 Hrs  T(C): 36.8 (24 Feb 2018 10:07), Max: 37.1 (23 Feb 2018 21:55)  T(F): 98.2 (24 Feb 2018 10:07), Max: 98.8 (23 Feb 2018 21:55)  HR: 80 (24 Feb 2018 10:07) (66 - 91)  BP: 111/72 (24 Feb 2018 10:07) (91/51 - 150/86)  BP(mean): 87 (23 Feb 2018 18:30) (66 - 110)  RR: 18 (24 Feb 2018 10:07) (18 - 24)  SpO2: 96% (24 Feb 2018 10:07) (92% - 100%)    ASSESSMENT/ PLAN    Therapy to  be:	[ ] Continue   [x ] Discontinued   [x ] Change to prn Analgesics    Documentation and Verification of current medications:   [X] Done	[ ] Not done, not elligible    Comments:
SUBJECTIVE: Pt seen and examined, pt seen at bedside with NS resident and Dr Rdz. Pt reports some improvement with severe anterior thigh pain. He wants to go home. SCS turned on at bedside and pt reports some relief.     OVERNIGHT EVENTS: pain control    Vital Signs Last 24 Hrs  T(C): 36.6 (25 Feb 2018 05:45), Max: 37.1 (24 Feb 2018 21:22)  T(F): 97.8 (25 Feb 2018 05:45), Max: 98.8 (24 Feb 2018 21:22)  HR: 80 (25 Feb 2018 05:45) (79 - 86)  BP: 122/73 (25 Feb 2018 05:45) (111/62 - 159/76)  BP(mean): --  RR: 18 (25 Feb 2018 05:45) (18 - 18)  SpO2: 95% (25 Feb 2018 05:45) (93% - 96%)    PHYSICAL EXAM:    General: No Acute Distress     Neurological: Awake, alert oriented to person, place and time, Following Commands, PERRL, EOMI, Face Symmetrical, Speech Fluent, Moving all extremities, Muscle Strength normal in all four extremities, No Drift, Sensation to Light Touch Intact    Pulmonary: Clear to Auscultation, No Rales, No Rhonchi, No Wheezes     Cardiovascular: S1, S2, Regular Rate and Rhythm     Gastrointestinal: Soft, Nontender, Nondistended     Extremities: No calf tenderness     Incision: incisions x 2 c/d/i    LABS:                        15.3   16.2  )-----------( 241      ( 25 Feb 2018 07:05 )             43.9    02-25    136  |  99  |  20  ----------------------------<  175<H>  4.0   |  23  |  1.25    Ca    9.7      25 Feb 2018 07:05          02-24 @ 07:01  -  02-25 @ 07:00  --------------------------------------------------------  IN: 990 mL / OUT: 1800 mL / NET: -810 mL      DRAINS: none    MEDICATIONS:  Antibiotics:    Neuro:  cyclobenzaprine 10 milliGRAM(s) Oral three times a day  diazepam    Tablet 5 milliGRAM(s) Oral every 6 hours PRN spasms  DULoxetine 30 milliGRAM(s) Oral two times a day  ketorolac   Injectable 30 milliGRAM(s) IV Push every 6 hours  oxyCODONE    5 mG/acetaminophen 325 mG 2 Tablet(s) Oral every 4 hours PRN Severe Pain (7 - 10)  traMADol 50 milliGRAM(s) Oral two times a day PRN Moderate Pain (4 - 6)    Cardiac:  amLODIPine   Tablet 10 milliGRAM(s) Oral daily  hydrochlorothiazide 12.5 milliGRAM(s) Oral daily  losartan 50 milliGRAM(s) Oral daily    Pulm:    GI/:  pantoprazole    Tablet 40 milliGRAM(s) Oral before breakfast    Other:   dexamethasone  Injectable 6 milliGRAM(s) IV Push every 6 hours  influenza   Vaccine 0.5 milliLiter(s) IntraMuscular once  naloxone Injectable 0.1 milliGRAM(s) IV Push every 3 minutes PRN For ANY of the following changes in patient status:  A. RR LESS THAN 10 breaths per minute, B. Oxygen saturation LESS THAN 90%, C. Sedation score of 6    DIET: [x] Regular [] CCD [] Renal [] Puree [] Dysphagia [] Tube Feeds:     IMAGING:
CHIEF COMPLAINT: patient c/o pain in bilateral upper thighs, denies bowel/ bladder dysfunction, states PCA not helping      Vital Signs Last 24 Hrs  T(C): 36.7 (24 Feb 2018 13:41), Max: 37.1 (23 Feb 2018 21:55)  T(F): 98.1 (24 Feb 2018 13:41), Max: 98.8 (23 Feb 2018 21:55)  HR: 79 (24 Feb 2018 13:41) (67 - 91)  BP: 125/76 (24 Feb 2018 13:41) (111/72 - 150/86)  BP(mean): 87 (23 Feb 2018 18:30) (83 - 87)  RR: 18 (24 Feb 2018 13:41) (18 - 18)  SpO2: 94% (24 Feb 2018 13:41) (92% - 99%)    PHYSICAL EXAM:    Constitutional: No Acute Distress     Neurological: AOx3, Following Commands, Moving all Extremities     Motor exam:          Upper extremity                         Delt     Bicep     Tricep    HG                                                 R         5/5        5/5        5/5       5/5                                               L          5/5        5/5        5/5       5/5          Lower extremity                        HF         KF        KE       DF         PF                                                  R        5/5        5/5        5/5       5/5         5/5                                               L         5/5        5/5       5/5       5/5          5/5                                                 Sensation: [x] intact to light touch  [] decreased:     Pulmonary: Clear to Auscultation, No rales, No rhonchi, No wheezes     Cardiovascular: S1, S2, Regular rate and rhythm     Gastrointestinal: Soft, Non-tender, Non-distended     Extremities: No calf tenderness     Incision: c/d/i    LABS:     MEDICATIONS:    Neuro:  cyclobenzaprine 10 milliGRAM(s) Oral three times a day  DULoxetine 30 milliGRAM(s) Oral daily  oxyCODONE    5 mG/acetaminophen 325 mG 2 Tablet(s) Oral every 4 hours PRN Severe Pain (7 - 10)  traMADol 50 milliGRAM(s) Oral two times a day PRN Moderate Pain (4 - 6)    Cardiac:  amLODIPine   Tablet 10 milliGRAM(s) Oral daily  hydrochlorothiazide 12.5 milliGRAM(s) Oral daily  losartan 50 milliGRAM(s) Oral daily    GI/:  pantoprazole    Tablet 40 milliGRAM(s) Oral before breakfast    Other:   naloxone Injectable 0.1 milliGRAM(s) IV Push every 3 minutes PRN For ANY of the following changes in patient status:  A. RR LESS THAN 10 breaths per minute, B. Oxygen saturation LESS THAN 90%, C. Sedation score of 6    DIET: [x] Regular [] CCD [] Renal [] Puree [] Dysphagia [] Tube Feeds:     IMAGING:   < from: CT Lumbar Spine No Cont (02.23.18 @ 20:26) >  IMPRESSION: Evidence of lumbosacral transitional anatomy with   lumbarization of the S1 vertebral body.  No acute fractures or dislocations.  Old right transverse process fracture at L4.  Mild multilevel degenerative changes.  < end of copied text >

## 2018-02-25 NOTE — DISCHARGE NOTE ADULT - ADDITIONAL INSTRUCTIONS
Any sign of fever, chills, discharge from incisions or increased pain not relieved with pain medication contact Dr Rdz or return to ER

## 2018-02-25 NOTE — DISCHARGE NOTE ADULT - HOSPITAL COURSE
57 year old male with PMH of HTN, GERD, chronic lower back pain to left buttocks s/p lumbar laminectomy in the past planned for Thoracic laminectomy for spinal cord stimulator, pulse generator placement on 2/23/18. ( According to patient he is having spinal cord stimulator placement only not laminectomy, called spoke to Vandana at Dr. Rdz's office, she will follow up regarding the procedure clarification and Pending thoracic MRI and will notify PST and patient).     On 2/23/18 pt went to the OR for placement of Spinal Cord Stimulator and IPG. Post operatively pt experienced severe anterior thigh pain and remained hospitalized until pain was controlled.  Pain improved with steroids, narcotics, NSAIDS and muscle relaxants. Pt was evaluated by Physical Therapy and no PT needs were indicated. Pt is medically and neurologically stable for discharge to home.

## 2018-02-25 NOTE — DISCHARGE NOTE ADULT - CARE PLAN
Principal Discharge DX:	Chronic lower back pain  Goal:	2/23/18 s/p T8-T9 placement of Spinal Cord Stimulator and IPG  Assessment and plan of treatment:	Follow up with Dr Rdz on Tuesday. Call office to make appointment. You may shower. Pat incisions dry with towel after shower. No rubbing or scrubbing of incisions. Do not apply ointments or creams to incisions. Keep incisions clean and dry.  Secondary Diagnosis:	Spinal cord stimulator status  Goal:	stable  Assessment and plan of treatment:	Continue pain medications as prescribed  Secondary Diagnosis:	Essential hypertension  Goal:	stable  Assessment and plan of treatment:	continue current medications

## 2018-02-25 NOTE — PROGRESS NOTE ADULT - ASSESSMENT
HPI:  57 year old male with PMH of HTN, GERD, chronic lower back pain to left buttocks s/p lumbar laminectomy in the past planned for Thoracic laminectomy for spinal cord stimulator, pulse generator placement on 2/23/18. ( According to patient he is having spinal cord stimulator placement only not laminectomy, called spoke to Vandana at Dr. Rdz's office, she will follow up regarding the procedure clarification and Pending thoracic MRI and will notify PST and patient). (15 Feb 2018 09:20)    PAST MEDICAL & SURGICAL HISTORY:  Spinal stenosis of lumbar region  Chronic lower back pain  Gastroesophageal reflux disease, esophagitis presence not specified  Essential hypertension  Status post hip surgery  Neuroma: foot removal  History of tonsillectomy  History of lumbar laminectomy    PROCEDURE: 2/23/18 Placement of T8-9 SCS and IPG.      PLAN:  Neuro: flexeril increased, valium prn, cont tramadol  Respiratory: patient instructed on incentive spirometer           DVT ppx: [] SQH [] SQL and venodynes bilaterally, no chemoprophylaxis as patient ambulating  GI: bowel regimen  PT/OT: no skilled needs  trial of decadron and toradol x1    case discussed with Dr Rdz  [facility] # [id number]
57 year old male with PMH of HTN, GERD, chronic lower back pain to left buttocks s/p lumbar laminectomy in the past planned for Thoracic laminectomy for spinal cord stimulator, pulse generator placement on 2/23/18. ( According to patient he is having spinal cord stimulator placement only not laminectomy, called spoke to Vandana at Dr. Rdz's office, she will follow up regarding the procedure clarification and Pending thoracic MRI and will notify PST and patient). (15 Feb 2018 09:20)    PROCEDURE: 2/23 s/p Spinal cord stimulator and IPG placement     POD#2    PLAN:  Neuro:   - pt reports some relief of anterior thigh pain/burning with steroids  - pain control- discharge home with lidoderm patch, neurontin, medrol dosepak  - continue cymbalta  Respiratory:   - encouraged incentive spirometry  CV:  -HTN- continue losartan, HCTZ, amlodipine   DVT ppx:   - venodynes, ambulation  PT/OT: no needs  Discussed with Dr Rdz      [facility] # [id number]

## 2018-02-25 NOTE — DISCHARGE NOTE ADULT - CARE PROVIDER_API CALL
Cullen Rdz (MD), Neurosurgery  General  [address] [address]  Phone: [phone]  Fax: [phone]    Primary Care Physician,   Phone: (   )    -  Fax: (   )    -

## 2018-02-25 NOTE — DISCHARGE NOTE ADULT - PLAN OF CARE
2/23/18 s/p T8-T9 placement of Spinal Cord Stimulator and IPG Follow up with Dr Rdz on Tuesday. Call office to make appointment. You may shower. Pat incisions dry with towel after shower. No rubbing or scrubbing of incisions. Do not apply ointments or creams to incisions. Keep incisions clean and dry. stable Continue pain medications as prescribed continue current medications

## 2018-02-25 NOTE — DISCHARGE NOTE ADULT - MEDICATION SUMMARY - MEDICATIONS TO TAKE
I will START or STAY ON the medications listed below when I get home from the hospital:    methylPREDNISolone 4 mg oral tablet  -- Use as directed  -- It is very important that you take or use this exactly as directed.  Do not skip doses or discontinue unless directed by your doctor.  Obtain medical advice before taking any non-prescription drugs as some may affect the action of this medication.  Take with food or milk.    -- Indication: For post op pain    Dilaudid 2 mg oral tablet  -- 1 tab(s) by mouth every 4 hours, As Needed -for severe pain MDD:3  -- Caution federal law prohibits the transfer of this drug to any person other  than the person for whom it was prescribed.  May cause drowsiness.  Alcohol may intensify this effect.  Use care when operating dangerous machinery.  This prescription cannot be refilled.  Using more of this medication than prescribed may cause serious breathing problems.    -- Indication: For pain    olmesartan 40 mg oral tablet  -- 1 tab(s) by mouth once a day  -- Indication: For Essential hypertension    gabapentin 300 mg oral capsule  -- 1 cap(s) by mouth 3 times a day   -- It is very important that you take or use this exactly as directed.  Do not skip doses or discontinue unless directed by your doctor.  May cause drowsiness.  Alcohol may intensify this effect.  Use care when operating dangerous machinery.    -- Indication: For Nerve pain    Cymbalta 30 mg oral delayed release capsule  -- 1 cap(s) by mouth once a day  -- Indication: For Chronic pain/anxiety    pravastatin 20 mg oral tablet  -- 1 tab(s) by mouth once a day  -- Indication: For Cholesterol    amLODIPine 10 mg oral tablet  -- 1 tab(s) by mouth once a day  -- Indication: For Essential hypertension    Lidoderm 5% topical film  -- Apply on skin to affected area every 12 hours, on 12 hours off 12 hours MDD:1  -- For external use only.  Remove old patch prior to applying a new patch.    -- Indication: For thigh pain    hydroCHLOROthiazide 12.5 mg oral capsule  -- 1 cap(s) by mouth once a day  -- Indication: For Essential hypertension    omeprazole 20 mg oral delayed release tablet  -- 1 tab(s) by mouth once a day  -- Indication: For reflux

## 2018-02-25 NOTE — DISCHARGE NOTE ADULT - NS AS ACTIVITY OBS
No Heavy lifting/straining/Do not make important decisions/Walking-Indoors allowed/Showering allowed/Walking-Outdoors allowed/Stairs allowed/Do not drive or operate machinery

## 2018-02-25 NOTE — DISCHARGE NOTE ADULT - PATIENT PORTAL LINK FT
You can access the [facility], offered by [facility], by registering with the following website: [email or web address]

## 2018-02-27 ENCOUNTER — APPOINTMENT (OUTPATIENT)
Dept: NEUROSURGERY | Facility: CLINIC | Age: 58
End: 2018-02-27
Payer: COMMERCIAL

## 2018-02-27 PROCEDURE — 99024 POSTOP FOLLOW-UP VISIT: CPT

## 2018-03-09 ENCOUNTER — RX RENEWAL (OUTPATIENT)
Age: 58
End: 2018-03-09

## 2018-03-09 DIAGNOSIS — G89.18 OTHER ACUTE POSTPROCEDURAL PAIN: ICD-10-CM

## 2018-03-09 RX ORDER — GABAPENTIN 800 MG/1
800 TABLET, COATED ORAL 3 TIMES DAILY
Qty: 90 | Refills: 3 | Status: ACTIVE | COMMUNITY
Start: 2018-03-09 | End: 1900-01-01

## 2018-03-09 RX ORDER — TRAMADOL HYDROCHLORIDE 50 MG/1
50 TABLET, COATED ORAL
Qty: 24 | Refills: 0 | Status: ACTIVE | COMMUNITY
Start: 2018-03-09 | End: 1900-01-01

## 2018-04-10 ENCOUNTER — APPOINTMENT (OUTPATIENT)
Dept: NEUROSURGERY | Facility: CLINIC | Age: 58
End: 2018-04-10
Payer: COMMERCIAL

## 2018-04-10 VITALS
WEIGHT: 228 LBS | HEIGHT: 72 IN | DIASTOLIC BLOOD PRESSURE: 86 MMHG | HEART RATE: 62 BPM | SYSTOLIC BLOOD PRESSURE: 125 MMHG | BODY MASS INDEX: 30.88 KG/M2

## 2018-04-10 PROCEDURE — 99024 POSTOP FOLLOW-UP VISIT: CPT

## 2018-04-11 RX ORDER — GABAPENTIN 300 MG/1
300 CAPSULE ORAL
Qty: 90 | Refills: 0 | Status: ACTIVE | COMMUNITY
Start: 2018-02-25

## 2018-07-10 ENCOUNTER — APPOINTMENT (OUTPATIENT)
Dept: NEUROSURGERY | Facility: CLINIC | Age: 58
End: 2018-07-10

## 2018-07-23 PROBLEM — I10 ESSENTIAL (PRIMARY) HYPERTENSION: Chronic | Status: ACTIVE | Noted: 2018-02-15

## 2018-07-23 PROBLEM — M48.061 SPINAL STENOSIS, LUMBAR REGION WITHOUT NEUROGENIC CLAUDICATION: Chronic | Status: ACTIVE | Noted: 2018-02-15

## 2018-07-23 PROBLEM — M54.5 LOW BACK PAIN: Chronic | Status: ACTIVE | Noted: 2018-02-15

## 2018-07-23 PROBLEM — K21.9 GASTRO-ESOPHAGEAL REFLUX DISEASE WITHOUT ESOPHAGITIS: Chronic | Status: ACTIVE | Noted: 2018-02-15

## 2018-07-24 ENCOUNTER — APPOINTMENT (OUTPATIENT)
Dept: NEUROSURGERY | Facility: CLINIC | Age: 58
End: 2018-07-24
Payer: COMMERCIAL

## 2018-07-24 VITALS
BODY MASS INDEX: 29.8 KG/M2 | DIASTOLIC BLOOD PRESSURE: 80 MMHG | SYSTOLIC BLOOD PRESSURE: 130 MMHG | HEIGHT: 72 IN | HEART RATE: 64 BPM | WEIGHT: 220 LBS

## 2018-07-24 PROCEDURE — 99214 OFFICE O/P EST MOD 30 MIN: CPT

## 2019-11-07 NOTE — BRIEF OPERATIVE NOTE - PRE-OP
<<-----Click on this checkbox to enter Pre-Op Dx Where Do You Want The Question To Include Opioid Counseling Located?: Case Summary Tab

## 2020-05-08 DIAGNOSIS — Z78.9 OTHER SPECIFIED HEALTH STATUS: ICD-10-CM

## 2020-05-13 ENCOUNTER — APPOINTMENT (OUTPATIENT)
Dept: SPINE | Facility: CLINIC | Age: 60
End: 2020-05-13
Payer: COMMERCIAL

## 2020-05-13 VITALS
OXYGEN SATURATION: 98 % | TEMPERATURE: 98.6 F | WEIGHT: 220 LBS | RESPIRATION RATE: 16 BRPM | BODY MASS INDEX: 29.8 KG/M2 | HEIGHT: 72 IN | HEART RATE: 68 BPM | SYSTOLIC BLOOD PRESSURE: 136 MMHG | DIASTOLIC BLOOD PRESSURE: 84 MMHG

## 2020-05-13 DIAGNOSIS — M54.9 DORSALGIA, UNSPECIFIED: ICD-10-CM

## 2020-05-13 PROCEDURE — 99214 OFFICE O/P EST MOD 30 MIN: CPT

## 2020-05-13 NOTE — HISTORY OF PRESENT ILLNESS
[Other: ___] : [unfilled] [de-identified] : PETER CORCORAN is a 59 year  old gentleman who years ago had left hip pain and had physical therapy with little help.  He then underwent a laminectomy by Dr. Nova and stated that he awoke with pain in his legs.  He was seen by Dr. Fernie Barrera and had a [employer] SCS trial which was not helpful because he sensed too much vibration.  Three years later he underwent a trial and placement of a Nevro spinal cord stimulator with Dr. Cullen Rdz.  The patient stated that since placement of the SCS he has been experiencing burning type pain in the front of his thighs and left great toe tingling.  The patient has had physical therapy, epidural injections, chiropractics and acupuncture with little relief.\par  [FreeTextEntry1] : Anterior thigh burning pain.

## 2020-05-13 NOTE — REVIEW OF SYSTEMS
[Arthralgias] : arthralgias [Joint Pain] : joint pain [As Noted in HPI] : as noted in HPI [Negative] : Heme/Lymph [de-identified] : yaakov

## 2020-05-13 NOTE — ASSESSMENT
[FreeTextEntry1] : Please see associated dictation by Dr. Jose Alejandro Pope m.D.  It was discussed that the patient may benefit from an L3-L4, l4-L5 decompression, bone graft and spacer and posterior hardware and screws.  The patient does not want to pursue surgery at this time.

## 2020-05-13 NOTE — RESULTS/DATA
[FreeTextEntry1] : The SCS implant is at T8 T9.  There is foraminal stenosis at L3-L4 impinging on the nerve roots and to a lesser degree at L4-L5.

## 2020-05-13 NOTE — REASON FOR VISIT
[New Patient Visit] : a new patient visit [Referred By: _________] : Patient was referred by CECIL [FreeTextEntry1] : The patient c/o bilateral frontal thigh pain.

## 2020-05-13 NOTE — PHYSICAL EXAM
[Person] : oriented to person [Place] : oriented to place [Time] : oriented to time [Short Term Intact] : short term memory intact [Remote Intact] : remote memory intact [Span Intact] : the attention span was normal [Concentration Intact] : normal concentrating ability [Fluency] : fluency intact [Current Events] : adequate knowledge of current events [Comprehension] : comprehension intact [Vocabulary] : adequate range of vocabulary [Past History] : adequate knowledge of personal past history [Cranial Nerves Oculomotor (III)] : extraocular motion intact [Cranial Nerves Optic (II)] : visual acuity intact bilaterally,  pupils equal round and reactive to light [Cranial Nerves Trigeminal (V)] : facial sensation intact symmetrically [Cranial Nerves Facial (VII)] : face symmetrical [Cranial Nerves Vestibulocochlear (VIII)] : hearing was intact bilaterally [Cranial Nerves Glossopharyngeal (IX)] : tongue and palate midline [Cranial Nerves Accessory (XI - Cranial And Spinal)] : head turning and shoulder shrug symmetric [Cranial Nerves Hypoglossal (XII)] : there was no tongue deviation with protrusion [Motor Tone] : muscle tone was normal in all four extremities [Motor Strength] : muscle strength was normal in all four extremities [No Muscle Atrophy] : normal bulk in all four extremities [Sensation Tactile Decrease] : light touch was intact [5] : S1 ankle flexors 5/5 [Balance] : balance was intact [Past-pointing] : there was no past-pointing [Abnormal Walk] : normal gait [Tremor] : no tremor present [2+] : Triceps left 2+ [1+] : Patella left 1+

## 2020-05-13 NOTE — DATA REVIEWED
[de-identified] : Thoracic done at [facility] on 05/02/2019 [de-identified] : Lumbar spine w/o contrast at [facility] 01/08/2020 [de-identified] : Complete spine myelogram.

## 2021-04-13 ENCOUNTER — APPOINTMENT (OUTPATIENT)
Dept: DISASTER EMERGENCY | Facility: OTHER | Age: 61
End: 2021-04-13
Payer: COMMERCIAL

## 2021-04-13 PROCEDURE — 0012A: CPT

## 2021-12-13 NOTE — ASU DISCHARGE PLAN (ADULT/PEDIATRIC). - BATHING
shower only Bill For Surgical Tray: no Billing Type: Third-Party Bill Expected Date Of Service: 12/13/2021

## 2022-05-19 ENCOUNTER — APPOINTMENT (OUTPATIENT)
Dept: PAIN MANAGEMENT | Facility: CLINIC | Age: 62
End: 2022-05-19
Payer: COMMERCIAL

## 2022-05-19 VITALS — HEIGHT: 72 IN | WEIGHT: 223 LBS | BODY MASS INDEX: 30.2 KG/M2

## 2022-05-19 DIAGNOSIS — I10 ESSENTIAL (PRIMARY) HYPERTENSION: ICD-10-CM

## 2022-05-19 PROCEDURE — 99214 OFFICE O/P EST MOD 30 MIN: CPT | Mod: 25

## 2022-05-19 PROCEDURE — 95972 ALYS CPLX SP/PN NPGT W/PRGRM: CPT

## 2022-05-19 NOTE — DISCUSSION/SUMMARY
[Medication Risks Reviewed] : Medication risks reviewed [de-identified] : Discussion\par After discussing various treatment options with the patient including but not limited to oral medications, physical therapy, exercise,\par modalities as well as interventional spinal injections, we have decided with the following plan\par \par Medications Renewal\par The patient is stable on current pain medication with analgesia and without notable side effects or any obvious aberrant\par behaviors exhibited.\par \par There is clinically meaningful improvement in pain and function that outweighs risks to patient safety. I have discussed risks and\par realistic benefits of opioid therapy and patient and clinician responsibilities for managing therapy. Pain agreement has been\par signed.\par \par Will renew meds today\par \par Follow up in 4-6 weeks\par Follow up in 4-6 weeks or sooner if there are any problems.\par \par Conservative Care\par Continue Home exercises, stretching, activity modification, physical therapy, and conservative care.\par \par  \par I have consulted the  Registry for the purpose of reviewing the patient's controlled substance\par \par  Fall Risk Counseling\par The patient was provided Fall Risk counseling due to the prescribed medication(s). The patient is aware of the risks associated with\par their medication(s)\par

## 2022-05-19 NOTE — HISTORY OF PRESENT ILLNESS
[Lower back] : lower back [2] : 2 [0] : 0 [Dull/Aching] : dull/aching [Constant] : constant [Household chores] : household chores [Leisure] : leisure [] : no [FreeTextEntry9] : stretching [de-identified] : twisting

## 2022-05-19 NOTE — PHYSICAL EXAM
[Normal Coordination] : normal coordination [Normal DTR UE/LE] : normal DTR UE/LE  [Normal Sensation] : normal sensation [Normal Mood and Affect] : normal mood and affect [Orientated] : orientated [Able to Communicate] : able to communicate [Normal Skin] : normal skin [No Rash] : no rash [No Ulcers] : no ulcers [No Lesions] : no lesions [No obvious lymphadenopathy in areas examined] : no obvious lymphadenopathy in areas examined [Well Developed] : well developed [Well Nourished] : well nourished [No Respiratory Distress] : no respiratory distress [] : motor exam is non-focal throughout both lower extremities
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
## 2022-08-10 ENCOUNTER — APPOINTMENT (OUTPATIENT)
Dept: PAIN MANAGEMENT | Facility: CLINIC | Age: 62
End: 2022-08-10

## 2022-08-10 VITALS — HEIGHT: 72 IN | WEIGHT: 226 LBS | BODY MASS INDEX: 30.61 KG/M2

## 2022-08-10 PROCEDURE — 99072 ADDL SUPL MATRL&STAF TM PHE: CPT

## 2022-08-10 PROCEDURE — 95972 ALYS CPLX SP/PN NPGT W/PRGRM: CPT

## 2022-08-10 PROCEDURE — 99214 OFFICE O/P EST MOD 30 MIN: CPT | Mod: 25

## 2022-08-10 NOTE — ASSESSMENT
[FreeTextEntry1] : [employer] rep met with pt. Seemed to be issue with programming- reprogrammed, and will re-assess in 2 weeks
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
## 2022-08-10 NOTE — PHYSICAL EXAM
[Normal Coordination] : normal coordination [Normal DTR UE/LE] : normal DTR UE/LE  [Normal Sensation] : normal sensation [Normal Mood and Affect] : normal mood and affect [Orientated] : orientated [Able to Communicate] : able to communicate [Normal Skin] : normal skin [No Rash] : no rash [No Ulcers] : no ulcers [No Lesions] : no lesions [No obvious lymphadenopathy in areas examined] : no obvious lymphadenopathy in areas examined [Well Developed] : well developed [Well Nourished] : well nourished [No Respiratory Distress] : no respiratory distress [] : positive straight leg raise

## 2022-08-15 ENCOUNTER — APPOINTMENT (OUTPATIENT)
Dept: ORTHOPEDIC SURGERY | Facility: CLINIC | Age: 62
End: 2022-08-15

## 2022-08-26 ENCOUNTER — APPOINTMENT (OUTPATIENT)
Dept: PAIN MANAGEMENT | Facility: CLINIC | Age: 62
End: 2022-08-26

## 2022-08-26 VITALS — WEIGHT: 226 LBS | BODY MASS INDEX: 29.95 KG/M2 | HEIGHT: 73 IN

## 2022-08-26 DIAGNOSIS — M48.00 SPINAL STENOSIS, SITE UNSPECIFIED: ICD-10-CM

## 2022-08-26 PROCEDURE — 99072 ADDL SUPL MATRL&STAF TM PHE: CPT

## 2022-08-26 PROCEDURE — 99214 OFFICE O/P EST MOD 30 MIN: CPT

## 2022-08-26 NOTE — REASON FOR VISIT
[Follow-Up Visit] : a follow-up pain management visit [FreeTextEntry2] : Pt comes for a follow up, lower back pain radiating to the left hip
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
## 2022-08-26 NOTE — ASSESSMENT
[FreeTextEntry1] : Pt notes he has moved to a prior program and overall he is doing better, the programs created at last visit had not offered any relief. Only area of pain is over L piriformis which when injected in the past offered relief.

## 2022-08-26 NOTE — PHYSICAL EXAM
[Normal Coordination] : normal coordination [Normal DTR UE/LE] : normal DTR UE/LE  [Normal Sensation] : normal sensation [Normal Mood and Affect] : normal mood and affect [Able to Communicate] : able to communicate [Normal Skin] : normal skin [No Rash] : no rash [No Ulcers] : no ulcers [No Lesions] : no lesions [No obvious lymphadenopathy in areas examined] : no obvious lymphadenopathy in areas examined [Well Developed] : well developed [Well Nourished] : well nourished [No Respiratory Distress] : no respiratory distress [] : positive straight leg raise

## 2022-08-26 NOTE — DISCUSSION/SUMMARY
[Surgical risks reviewed] : Surgical risks reviewed [de-identified] : After discussing various treatment options with the patient including but not limited to oral medications, physical therapy, exercise,\par modalities as well as interventional spinal injections, we have decided with the following plan\par \par I personally reviewed the MRI/CT scan images and agree with the radiologist's report. The\par radiological findings were discussed with the patient.\par \par \par The risks, benefits, contents and alternatives to injection were explained in full to the patient. Risks outlined include but are not\par limited to infection,sepsis, bleeding, post-dural puncture headache, nerve damage, temporary increase in pain, syncopal episode,\par failure to resolve symptoms, allergic reaction, symptom recurrence, and elevation of blood sugar in diabetics. Cortisone may cause\par immunosuppression. Patient understands the risks. All questions were answered. After discussion of options, patient requested\par an injection. Information regarding the injection was given to the patient. Which medications to stop prior to the injection was\par explained to the patient as well.\par \par Follow up in 1-2 weeks post injection for re-evaluation.\par \par  Conservative Care\par Continue Home exercises, stretching, activity modification, physical therapy, and conservative care.\par \par \par L Piriformis Inj

## 2022-09-08 LAB — SARS-COV-2 N GENE NPH QL NAA+PROBE: NOT DETECTED

## 2022-09-12 ENCOUNTER — APPOINTMENT (OUTPATIENT)
Age: 62
End: 2022-09-12

## 2022-09-12 PROCEDURE — 64445 NJX AA&/STRD SCIATIC NRV IMG: CPT | Mod: 1L,LT

## 2022-09-12 PROCEDURE — J3490M: CUSTOM

## 2022-09-26 ENCOUNTER — APPOINTMENT (OUTPATIENT)
Dept: ORTHOPEDIC SURGERY | Facility: CLINIC | Age: 62
End: 2022-09-26

## 2022-09-26 VITALS — HEIGHT: 73 IN | BODY MASS INDEX: 26.51 KG/M2 | WEIGHT: 200 LBS

## 2022-09-26 PROCEDURE — 73030 X-RAY EXAM OF SHOULDER: CPT | Mod: RT

## 2022-09-26 PROCEDURE — 72100 X-RAY EXAM L-S SPINE 2/3 VWS: CPT

## 2022-09-26 PROCEDURE — 99214 OFFICE O/P EST MOD 30 MIN: CPT

## 2022-09-26 PROCEDURE — 99072 ADDL SUPL MATRL&STAF TM PHE: CPT

## 2022-09-26 PROCEDURE — 73503 X-RAY EXAM HIP UNI 4/> VIEWS: CPT | Mod: LT

## 2022-09-26 RX ORDER — MELOXICAM 15 MG/1
15 TABLET ORAL
Qty: 30 | Refills: 1 | Status: ACTIVE | COMMUNITY
Start: 2022-09-26 | End: 1900-01-01
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
## 2022-09-26 NOTE — DISCUSSION/SUMMARY
[de-identified] : The patient was advised of the diagnosis.  The natural history of the pathology was explained in full to the patient in layman's terms. All questions were answered.  The risks and benefits of surgical and non-surgical treatment alternatives were explained in full to the patient.\par \par Entered by Genny Schaeffer acting as scribe.\par
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
## 2022-09-26 NOTE — IMAGING
[Right] : right shoulder [There are no fractures, subluxations or dislocations. No significant abnormalities are seen] : There are no fractures, subluxations or dislocations. No significant abnormalities are seen [Left] : left hip with pelvis [All Views] : anteroposterior, lateral [de-identified] : right shoulder:\par acromium tenderness\par proximal biceps tenderness\par + impingement \par flexion: 80 \par abduction: 65 \par 5/5\par NVI\par \par left hip:\par good ROM\par no pain with resisted SLR\par + impingement \par greater troch tenderness\par \par right hip:\par good ROM\par \par no spine tenderness\par   [FreeTextEntry1] : severe DDD with degenerative scoliosis and spine simulator in place  [FreeTextEntry9] : no fx , good debridement of CAM

## 2022-10-06 ENCOUNTER — APPOINTMENT (OUTPATIENT)
Dept: PAIN MANAGEMENT | Facility: CLINIC | Age: 62
End: 2022-10-06

## 2022-10-06 VITALS — WEIGHT: 200 LBS | HEIGHT: 73 IN | BODY MASS INDEX: 26.51 KG/M2

## 2022-10-06 DIAGNOSIS — G89.29 OTHER CHRONIC PAIN: ICD-10-CM

## 2022-10-06 PROCEDURE — 99072 ADDL SUPL MATRL&STAF TM PHE: CPT

## 2022-10-06 PROCEDURE — 99213 OFFICE O/P EST LOW 20 MIN: CPT
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
## 2022-10-29 ENCOUNTER — APPOINTMENT (OUTPATIENT)
Dept: ORTHOPEDIC SURGERY | Facility: CLINIC | Age: 62
End: 2022-10-29

## 2022-10-29 VITALS — WEIGHT: 200 LBS | BODY MASS INDEX: 26.51 KG/M2 | HEIGHT: 73 IN

## 2022-10-29 PROCEDURE — 99204 OFFICE O/P NEW MOD 45 MIN: CPT | Mod: ACP

## 2022-10-29 PROCEDURE — 99072 ADDL SUPL MATRL&STAF TM PHE: CPT | Mod: ACP

## 2022-10-29 RX ORDER — CYCLOBENZAPRINE HYDROCHLORIDE 10 MG/1
10 TABLET, FILM COATED ORAL
Qty: 30 | Refills: 1 | Status: ACTIVE | COMMUNITY
Start: 2022-10-29 | End: 1900-01-01
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
## 2022-10-29 NOTE — ASSESSMENT
[FreeTextEntry1] : A/P R shoulder strain\par - MRI r/o RC tear\par - ice\par - injection deferred for MRI\par - muscle relaxants\par - WBAT\par - f/u 1 week with shoulder surgery after MRI\par

## 2022-11-02 ENCOUNTER — APPOINTMENT (OUTPATIENT)
Dept: ORTHOPEDIC SURGERY | Facility: CLINIC | Age: 62
End: 2022-11-02

## 2022-11-02 VITALS — HEIGHT: 73 IN | BODY MASS INDEX: 26.51 KG/M2 | WEIGHT: 200 LBS

## 2022-11-02 DIAGNOSIS — M75.41 IMPINGEMENT SYNDROME OF RIGHT SHOULDER: ICD-10-CM

## 2022-11-02 PROCEDURE — 99072 ADDL SUPL MATRL&STAF TM PHE: CPT

## 2022-11-02 PROCEDURE — 99213 OFFICE O/P EST LOW 20 MIN: CPT | Mod: 25

## 2022-11-02 PROCEDURE — 20610 DRAIN/INJ JOINT/BURSA W/O US: CPT

## 2022-11-03 NOTE — PHYSICAL EXAM
[Right] : right shoulder [Sitting] : sitting [Mild] : mild [4 ___] : forward flexion 4[unfilled]/5 [] : no sensory deficits [TWNoteComboBox7] : active forward flexion 110 degrees

## 2022-11-03 NOTE — DATA REVIEWED
[MRI] : MRI [Right] : of the right [Shoulder] : shoulder [Report was reviewed and noted in the chart] : The report was reviewed and noted in the chart [I reviewed the films/CD and agree] : I reviewed the films/CD and agree [FreeTextEntry1] : IMPRESSION:  MRI of the right shoulder demonstrates: \par 1.  Full-thickness tear of the central supraspinatus tendon 2 cm medial to the footprint with 3 mm retraction. Low-grade partial-thickness articular surface tear of the anterior supraspinatus tendon.\par 2.  Moderate grade partial-thickness articular surface tear of the mid to superior subscapularis tendon with 2.5 cm retraction.\par 3.  Mild to moderate rotator cuff tendinosis. Mild subacromial/subdeltoid bursitis. Mild supraspinatus muscle atrophy.\par 4.  Moderate acromioclavicular joint arthrosis. Moderate thickening of the coracoacromial ligament.\par 5.  Medial dislocation of a mildly degenerated long head biceps tendon.\par 6.  Tear through the inferior labrum.\par 7.  Mild glenohumeral joint arthrosis with moderate effusion and synovitis. \par THAIS MASSEY MD  - Electronically Signed: 11-

## 2022-11-03 NOTE — DISCUSSION/SUMMARY
[de-identified] : The documentation recorded by the scribe accurately reflects the service I personally performed and the decisions made by me.\par I, Manolo Gold, attest that this documentation has been prepared under the direction and in the presence of Provider Clyde Oliveros MD.\par \par The patient was seen by Clyde Oliveros MD.\par \par The risks, benefits, and alternatives to cortisone injection were explained in full to the patient. Risks outlined include but are not limited to infection, sepsis, bleeding, scarring, skin discoloration, temporary increase in pain, syncopal episode, failure to resolve symptoms, allergic reaction, symptom recurrence, and elevation of blood sugar in diabetics. Patient understood the risks. All questions were answered. After discussion of options, patient requested an injection. Oral informed consent was obtained and sterile prep was done of the injection site. Sterile technique was utilized for the procedure including the preparation of the solutions used for the injection. Patient tolerated the procedure well. Advised to ice the injection site this evening. Prep with betadine locally to site. Sterile technique used and Prep with alcohol locally to site. Sterile technique used. Patient tolerated procedure well. Post Procedure Instructions: Patient was advised to call if redness, pain, or fever occur and apply ice for 15 min. out of every hour for the next 12-24 hours as tolerated. patient was advised to rest the joint(s) for 2 days. \par \par Biceps tenodesis vs. tenotomy is discussed with the patient.  The patient has complaints of biceps tendinitis and during surgery the biceps will be evaluated.  If a significant amount of fraying is noted, then a tenotomy or tenodesis will be performed. The patient is advised that if a tenodesis is performed, there is still a chance that a Jones deformity could develop even if the biceps tendon is sewn into the surrounding tissues.  The patient is advised that biceps tenodesis and tenotomy involve cutting the biceps at its origin point.  A tenotomy involves cutting it without suturing the remaining longhead tissues.  A tenodesis involves sewing it in the surrounding soft tissues or securing it in some similar fashion to the surrounding tissues.  Both methods carry with them the chance of a Jones deformity.  A tenodesis would have a less of a chance of a Jones deformity but there is still a chance that a deformity would develop.  The patient is advised that even after tenodesis or tenotomy is performed, that she may still experience occasional cramping in the muscle especially if a Jones deformity develops but the pain from the tendinitis should improve significantly because the longhead tendon no longer slides in the intertubercular groove.\par

## 2022-11-03 NOTE — ASSESSMENT
[FreeTextEntry1] : 11/02/2022: MRI reviewed and discussed. \par Advised arthroscopic candidate for rotator cuff repair and tenotomy.\par Discussed with the patient the procedure, recovery process, risks and benefits.\par Non operative vs operative interventions discussed \par Advised tear does not heal on its own \par Offered and Patient elected to proceed with steroid injection for the right sa \par Questions addressed. \par Activity modifier as tolerated. \par Apply ice to affected area.
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
## 2022-11-03 NOTE — PROCEDURE
[Large Joint Injection] : Large joint injection [Right] : of the right [Subacromial Space] : subacromial space [Pain] : pain [Inflammation] : inflammation [___ cc    6mg] :  Betamethasone (Celestone) ~Vcc of 6mg [___ cc    1%] : Lidocaine ~Vcc of 1%  [] : Patient tolerated procedure well [Call if redness, pain or fever occur] : call if redness, pain or fever occur [Apply ice for 15min out of every hour for the next 12-24 hours as tolerated] : apply ice for 15 minutes out of every hour for the next 12-24 hours as tolerated [Patient was advised to rest the joint(s) for ____ days] : patient was advised to rest the joint(s) for [unfilled] days [de-identified] :  The site was prepped with alcohol, betadine, ethyl chloride sprayed topically and sterile technique used.

## 2022-11-28 ENCOUNTER — APPOINTMENT (OUTPATIENT)
Dept: ORTHOPEDIC SURGERY | Facility: CLINIC | Age: 62
End: 2022-11-28

## 2022-11-28 VITALS — WEIGHT: 200 LBS | BODY MASS INDEX: 26.51 KG/M2 | HEIGHT: 73 IN

## 2022-11-28 DIAGNOSIS — M25.552 PAIN IN LEFT HIP: ICD-10-CM

## 2022-11-28 DIAGNOSIS — M16.12 UNILATERAL PRIMARY OSTEOARTHRITIS, LEFT HIP: ICD-10-CM

## 2022-11-28 PROCEDURE — 99213 OFFICE O/P EST LOW 20 MIN: CPT

## 2022-11-28 PROCEDURE — 99072 ADDL SUPL MATRL&STAF TM PHE: CPT

## 2022-11-28 RX ORDER — DICLOFENAC SODIUM AND MISOPROSTOL 75; 200 MG/1; UG/1
75-0.2 TABLET, DELAYED RELEASE ORAL
Qty: 60 | Refills: 0 | Status: ACTIVE | COMMUNITY
Start: 2022-11-28 | End: 1900-01-01

## 2022-11-28 NOTE — IMAGING
[Right] : right shoulder [There are no fractures, subluxations or dislocations. No significant abnormalities are seen] : There are no fractures, subluxations or dislocations. No significant abnormalities are seen [Left] : left hip with pelvis [All Views] : anteroposterior, lateral [de-identified] : right shoulder:\par acromium tenderness\par proximal biceps tenderness\par + impingement \par flexion: 80 \par abduction: 65 \par 5/5\par NVI\par \par left hip:\par good ROM\par no pain with resisted SLR\par + impingement \par greater troch tenderness\par \par right hip:\par good ROM\par \par no spine tenderness\par   [FreeTextEntry1] : severe DDD with degenerative scoliosis and spine simulator in place  [FreeTextEntry9] : no fx , good debridement of CAM

## 2022-11-28 NOTE — DISCUSSION/SUMMARY
[de-identified] : The patient was advised of the diagnosis.  The natural history of the pathology was explained in full to the patient in layman's terms. All questions were answered.  The risks and benefits of surgical and non-surgical treatment alternatives were explained in full to the patient.\par \par Note completed by Chicho Bedolla PA-C acting as scribe.\par
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
## 2022-11-29 ENCOUNTER — APPOINTMENT (OUTPATIENT)
Dept: ORTHOPEDIC SURGERY | Facility: CLINIC | Age: 62
End: 2022-11-29

## 2022-11-29 VITALS — HEIGHT: 73 IN | BODY MASS INDEX: 26.51 KG/M2 | WEIGHT: 200 LBS

## 2022-11-29 DIAGNOSIS — Z96.89 PRESENCE OF OTHER SPECIFIED FUNCTIONAL IMPLANTS: ICD-10-CM

## 2022-11-29 PROCEDURE — 99214 OFFICE O/P EST MOD 30 MIN: CPT

## 2022-11-29 PROCEDURE — 99072 ADDL SUPL MATRL&STAF TM PHE: CPT
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
## 2022-11-29 NOTE — ASSESSMENT
[FreeTextEntry1] : went over plain xrays;  complex pathology;  scoli, slip , loss of disc height\par at the very least there is a strain sprain superimposed on an already complex situation;  the presence of imbalance, prior decompression and loss of disc height may have not led, in the wake of the injury, an even more complex situation;  going to see how things play out with PT and then we'll go from there

## 2022-12-14 ENCOUNTER — APPOINTMENT (OUTPATIENT)
Dept: ORTHOPEDIC SURGERY | Facility: CLINIC | Age: 62
End: 2022-12-14

## 2022-12-29 ENCOUNTER — APPOINTMENT (OUTPATIENT)
Dept: ORTHOPEDIC SURGERY | Facility: CLINIC | Age: 62
End: 2022-12-29

## 2023-01-04 ENCOUNTER — APPOINTMENT (OUTPATIENT)
Dept: ORTHOPEDIC SURGERY | Facility: CLINIC | Age: 63
End: 2023-01-04
Payer: COMMERCIAL

## 2023-01-04 PROCEDURE — 99072 ADDL SUPL MATRL&STAF TM PHE: CPT

## 2023-01-04 PROCEDURE — L3670: CPT | Mod: RT

## 2023-01-09 RX ORDER — HYDROCODONE BITARTRATE AND ACETAMINOPHEN 5; 325 MG/1; MG/1
5-325 TABLET ORAL
Qty: 25 | Refills: 0 | Status: ACTIVE | COMMUNITY
Start: 2023-01-09 | End: 1900-01-01

## 2023-01-10 ENCOUNTER — APPOINTMENT (OUTPATIENT)
Age: 63
End: 2023-01-10
Payer: COMMERCIAL

## 2023-01-10 PROCEDURE — 29826 SHO ARTHRS SRG DECOMPRESSION: CPT | Mod: AS,RT

## 2023-01-10 PROCEDURE — 29827 SHO ARTHRS SRG RT8TR CUF RPR: CPT | Mod: AS,RT

## 2023-01-10 PROCEDURE — 23405 INCISION OF TENDON & MUSCLE: CPT | Mod: AS,59,RT

## 2023-01-10 PROCEDURE — 29827 SHO ARTHRS SRG RT8TR CUF RPR: CPT | Mod: RT

## 2023-01-10 PROCEDURE — 29823 SHO ARTHRS SRG XTNSV DBRDMT: CPT | Mod: AS,59,RT

## 2023-01-10 PROCEDURE — 29820 SHO ARTHRS SRG PRTL SYNVCT: CPT | Mod: 59,RT

## 2023-01-10 PROCEDURE — 23405 INCISION OF TENDON & MUSCLE: CPT | Mod: 59,RT

## 2023-01-10 PROCEDURE — 29823 SHO ARTHRS SRG XTNSV DBRDMT: CPT | Mod: 59,RT

## 2023-01-10 PROCEDURE — 29826 SHO ARTHRS SRG DECOMPRESSION: CPT | Mod: RT

## 2023-01-10 PROCEDURE — 29820 SHO ARTHRS SRG PRTL SYNVCT: CPT | Mod: AS,59,RT

## 2023-01-10 RX ORDER — CEPHALEXIN 500 MG/1
500 CAPSULE ORAL 4 TIMES DAILY
Qty: 4 | Refills: 0 | Status: ACTIVE | COMMUNITY
Start: 2023-01-10 | End: 1900-01-01

## 2023-01-19 ENCOUNTER — APPOINTMENT (OUTPATIENT)
Dept: ORTHOPEDIC SURGERY | Facility: CLINIC | Age: 63
End: 2023-01-19
Payer: COMMERCIAL

## 2023-01-19 VITALS — WEIGHT: 200 LBS | HEIGHT: 73 IN | BODY MASS INDEX: 26.51 KG/M2

## 2023-01-19 LAB — SARS-COV-2 N GENE NPH QL NAA+PROBE: NOT DETECTED

## 2023-01-19 PROCEDURE — 99024 POSTOP FOLLOW-UP VISIT: CPT
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
## 2023-01-19 NOTE — DATA REVIEWED
[FreeTextEntry1] : IMPRESSION:  MRI of the right shoulder demonstrates: \par 1.  Full-thickness tear of the central supraspinatus tendon 2 cm medial to the footprint with 3 mm retraction. Low-grade partial-thickness articular surface tear of the anterior supraspinatus tendon.\par 2.  Moderate grade partial-thickness articular surface tear of the mid to superior subscapularis tendon with 2.5 cm retraction.\par 3.  Mild to moderate rotator cuff tendinosis. Mild subacromial/subdeltoid bursitis. Mild supraspinatus muscle atrophy.\par 4.  Moderate acromioclavicular joint arthrosis. Moderate thickening of the coracoacromial ligament.\par 5.  Medial dislocation of a mildly degenerated long head biceps tendon.\par 6.  Tear through the inferior labrum.\par 7.  Mild glenohumeral joint arthrosis with moderate effusion and synovitis. \par THAIS MASSEY MD  - Electronically Signed: 11-

## 2023-01-19 NOTE — ASSESSMENT
[FreeTextEntry1] : 11/02/2022: MRI reviewed and discussed. \par Advised arthroscopic candidate for rotator cuff repair and tenotomy.\par Discussed with the patient the procedure, recovery process, risks and benefits.\par Non operative vs operative interventions discussed \par Advised tear does not heal on its own \par Offered and Patient elected to proceed with steroid injection for the right sa \par Questions addressed. \par Activity modifier as tolerated. \par Apply ice to affected area. \par \par 01/19/2023: 1st postop visit of the right shoulder.\par Doing well. \par Start PT\par RTO 4 wks. \par Sling use to remain 4 wks after surgery. \par Questions addressed.\par

## 2023-01-23 ENCOUNTER — APPOINTMENT (OUTPATIENT)
Age: 63
End: 2023-01-23
Payer: COMMERCIAL

## 2023-01-23 PROCEDURE — 64445 NJX AA&/STRD SCIATIC NRV IMG: CPT

## 2023-02-07 ENCOUNTER — APPOINTMENT (OUTPATIENT)
Dept: ORTHOPEDIC SURGERY | Facility: CLINIC | Age: 63
End: 2023-02-07
Payer: COMMERCIAL

## 2023-02-07 VITALS — WEIGHT: 200 LBS | HEIGHT: 73 IN | BODY MASS INDEX: 26.51 KG/M2

## 2023-02-07 DIAGNOSIS — M54.10 RADICULOPATHY, SITE UNSPECIFIED: ICD-10-CM

## 2023-02-07 DIAGNOSIS — M41.80 OTHER FORMS OF SCOLIOSIS, SITE UNSPECIFIED: ICD-10-CM

## 2023-02-07 PROCEDURE — 99214 OFFICE O/P EST MOD 30 MIN: CPT | Mod: 24

## 2023-02-07 PROCEDURE — 99072 ADDL SUPL MATRL&STAF TM PHE: CPT

## 2023-02-07 NOTE — HISTORY OF PRESENT ILLNESS
[Lower back] : lower back [Sudden] : sudden [Radiating] : radiating [Shooting] : shooting [Stabbing] : stabbing [Throbbing] : throbbing [Rest] : rest [Meds] : meds [Bending forward] : bending forward [3] : 3 [0] : 0 [Occasional] : occasional [de-identified] : responding to the PT and injection(s) [] : no [FreeTextEntry3] : 07/21/2022 [FreeTextEntry5] : Pt here for follow up of low back pain. States he is doing better; attended PT and pain has gotten better. Pain management has given cortisone injs and helped greatly.  Hip pain has gotten better.  [FreeTextEntry7] : Down to both hips [de-identified] : Getting out of bed in the morning

## 2023-02-07 NOTE — ASSESSMENT
[FreeTextEntry1] : 'am not interested in undergoing surgery.'\par had some benefit from recent pain intervention and PT;  he is disinclined to undergo surgery;  going forwad pain management can guide his nonoperative care (from PT, injections or

## 2023-02-08 ENCOUNTER — APPOINTMENT (OUTPATIENT)
Dept: PAIN MANAGEMENT | Facility: CLINIC | Age: 63
End: 2023-02-08
Payer: COMMERCIAL

## 2023-02-08 VITALS — HEIGHT: 73 IN | BODY MASS INDEX: 27.83 KG/M2 | WEIGHT: 210 LBS

## 2023-02-08 DIAGNOSIS — Z98.890 OTHER SPECIFIED POSTPROCEDURAL STATES: ICD-10-CM

## 2023-02-08 DIAGNOSIS — G89.29 DORSALGIA, UNSPECIFIED: ICD-10-CM

## 2023-02-08 DIAGNOSIS — M54.9 DORSALGIA, UNSPECIFIED: ICD-10-CM

## 2023-02-08 PROCEDURE — 99072 ADDL SUPL MATRL&STAF TM PHE: CPT

## 2023-02-08 PROCEDURE — 99214 OFFICE O/P EST MOD 30 MIN: CPT

## 2023-02-08 RX ORDER — TRAMADOL HYDROCHLORIDE 50 MG/1
50 TABLET, COATED ORAL
Qty: 42 | Refills: 0 | Status: ACTIVE | COMMUNITY
Start: 2022-05-19 | End: 1900-01-01

## 2023-02-08 NOTE — DISCUSSION/SUMMARY
[Medication Risks Reviewed] : Medication risks reviewed [de-identified] : will hold off injections at this time and refill tramadol\par \par \par
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
## 2023-02-08 NOTE — PHYSICAL EXAM
[Normal Coordination] : normal coordination [Able to Communicate] : able to communicate [Well Developed] : well developed [Well Nourished] : well nourished [] : diminished ROM in all planes

## 2023-03-02 ENCOUNTER — APPOINTMENT (OUTPATIENT)
Dept: ORTHOPEDIC SURGERY | Facility: CLINIC | Age: 63
End: 2023-03-02
Payer: COMMERCIAL

## 2023-03-02 PROCEDURE — 99024 POSTOP FOLLOW-UP VISIT: CPT

## 2023-03-02 NOTE — ASSESSMENT
[FreeTextEntry1] : 11/02/2022: MRI reviewed and discussed. \par Advised arthroscopic candidate for rotator cuff repair and tenotomy.\par Discussed with the patient the procedure, recovery process, risks and benefits.\par Non operative vs operative interventions discussed \par Advised tear does not heal on its own \par Offered and Patient elected to proceed with steroid injection for the right sa \par Questions addressed. \par Activity modifier as tolerated. \par Apply ice to affected area. \par \par 01/19/2023: 1st postop visit of the right shoulder.\par Doing well. \par Start PT\par RTO 4 wks. \par Sling use to remain 4 wks after surgery. \par Questions addressed.\par \par 03/02/2023: 2nd post op visit. \par Continues to proceed as planned. \par Course outlined. \par PT to resume. \par Questions addressed.\par Activity modifier as tolerated.\par

## 2023-03-02 NOTE — PHYSICAL EXAM
[Right] : right shoulder [] : no sensory deficits [de-identified] : Not assessed [TWNoteComboBox4] : passive forward flexion 140 degrees
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
## 2023-03-30 ENCOUNTER — APPOINTMENT (OUTPATIENT)
Dept: ORTHOPEDIC SURGERY | Facility: CLINIC | Age: 63
End: 2023-03-30
Payer: COMMERCIAL

## 2023-03-30 VITALS — HEIGHT: 73 IN | BODY MASS INDEX: 27.83 KG/M2 | WEIGHT: 210 LBS

## 2023-03-30 DIAGNOSIS — Z98.890 OTHER SPECIFIED POSTPROCEDURAL STATES: ICD-10-CM

## 2023-03-30 DIAGNOSIS — S43.003A UNSPECIFIED SUBLUXATION OF UNSPECIFIED SHOULDER JOINT, INITIAL ENCOUNTER: ICD-10-CM

## 2023-03-30 PROCEDURE — 99024 POSTOP FOLLOW-UP VISIT: CPT

## 2023-03-30 NOTE — ASSESSMENT
[FreeTextEntry1] : 11/02/2022: MRI reviewed and discussed. \par Advised arthroscopic candidate for rotator cuff repair and tenotomy.\par Discussed with the patient the procedure, recovery process, risks and benefits.\par Non operative vs operative interventions discussed \par Advised tear does not heal on its own \par Offered and Patient elected to proceed with steroid injection for the right sa \par Questions addressed. \par Activity modifier as tolerated. \par Apply ice to affected area. \par \par DOS 1/10/2023: R shoulder scope\par \par 01/19/2023: 1st postop visit of the right shoulder.\par Doing well. \par Start PT\par RTO 4 wks. \par Sling use to remain 4 wks after surgery. \par Questions addressed.\par \par 03/02/2023: 2nd post op visit. \par Continues to proceed as planned. \par Course outlined. \par PT to resume. \par Questions addressed.\par Activity modifier as tolerated.\par \par 3/30/2023: approx 10 wks s/p right shoulder scope. \par Reports some AC pain and burning sensation. Denies f/s/c.\par Hopefully with some time this will calm down. \par Resume Advil as needed. \par Continue PT.
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
## 2023-03-30 NOTE — PHYSICAL EXAM
[Right] : right shoulder [Standing] : standing [Mild] : mild [] : no sensory deficits [de-identified] : Not assessed [TWNoteComboBox7] : active forward flexion 100 degrees [TWNoteComboBox4] : passive forward flexion 140 degrees

## 2023-05-11 ENCOUNTER — APPOINTMENT (OUTPATIENT)
Dept: ORTHOPEDIC SURGERY | Facility: CLINIC | Age: 63
End: 2023-05-11
Payer: COMMERCIAL

## 2023-05-11 VITALS — WEIGHT: 210 LBS | BODY MASS INDEX: 27.83 KG/M2 | HEIGHT: 73 IN

## 2023-05-11 DIAGNOSIS — M25.511 PAIN IN RIGHT SHOULDER: ICD-10-CM

## 2023-05-11 DIAGNOSIS — M75.121 COMPLETE ROTATOR CUFF TEAR OR RUPTURE OF RIGHT SHOULDER, NOT SPECIFIED AS TRAUMATIC: ICD-10-CM

## 2023-05-11 PROCEDURE — 99214 OFFICE O/P EST MOD 30 MIN: CPT

## 2023-05-11 NOTE — ASSESSMENT
[FreeTextEntry1] : 11/02/2022: MRI reviewed and discussed. \par Advised arthroscopic candidate for rotator cuff repair and tenotomy.\par Discussed with the patient the procedure, recovery process, risks and benefits.\par Non operative vs operative interventions discussed \par Advised tear does not heal on its own \par Offered and Patient elected to proceed with steroid injection for the right sa \par Questions addressed. \par Activity modifier as tolerated. \par Apply ice to affected area. \par \par DOS 1/10/2023: R shoulder scope\par \par 01/19/2023: 1st postop visit of the right shoulder.\par Doing well. \par Start PT\par RTO 4 wks. \par Sling use to remain 4 wks after surgery. \par Questions addressed.\par \par 03/02/2023: 2nd post op visit. \par Continues to proceed as planned. \par Course outlined. \par PT to resume. \par Questions addressed.\par Activity modifier as tolerated.\par \par 3/30/2023: approx 10 wks s/p right shoulder scope. \par Reports some AC pain and burning sensation. Denies f/s/c.\par Hopefully with some time this will calm down. \par Resume Advil as needed. \par Continue PT. \par \par 5/11/23: approx 4 months s/p R shoulder scope. \par Reports some AC pain and burning sensation. Denies f/s/c.\par Hopefully with some time this will calm down. Resume Advil as needed. \par Continue PT. Discussed importance of stretching. \par \par The documentation recorded by the scribe accurately reflects the service I personally performed and the decisions made by me.\par I, Manolo Gold, attest that this documentation has been prepared under the direction and in the presence of Provider Clyde Oliveros MD.\par \par The patient was seen by Clyde Oliveros MD.

## 2023-05-11 NOTE — PHYSICAL EXAM
[Right] : right shoulder [Sitting] : sitting [Mild] : mild [] : no sensory deficits [de-identified] : Not assessed [TWNoteComboBox7] : active forward flexion 115 degrees
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
## 2023-06-22 ENCOUNTER — APPOINTMENT (OUTPATIENT)
Dept: ORTHOPEDIC SURGERY | Facility: CLINIC | Age: 63
End: 2023-06-22

## 2023-09-18 ENCOUNTER — APPOINTMENT (OUTPATIENT)
Dept: PAIN MANAGEMENT | Facility: CLINIC | Age: 63
End: 2023-09-18
Payer: COMMERCIAL

## 2023-09-18 VITALS — BODY MASS INDEX: 29.12 KG/M2 | WEIGHT: 215 LBS | HEIGHT: 72 IN

## 2023-09-18 DIAGNOSIS — M54.16 RADICULOPATHY, LUMBAR REGION: ICD-10-CM

## 2023-09-18 DIAGNOSIS — M96.1 POSTLAMINECTOMY SYNDROME, NOT ELSEWHERE CLASSIFIED: ICD-10-CM

## 2023-09-18 PROCEDURE — 99214 OFFICE O/P EST MOD 30 MIN: CPT

## 2023-10-03 ENCOUNTER — APPOINTMENT (OUTPATIENT)
Dept: PAIN MANAGEMENT | Facility: CLINIC | Age: 63
End: 2023-10-03

## 2023-10-11 ENCOUNTER — APPOINTMENT (OUTPATIENT)
Dept: PAIN MANAGEMENT | Facility: CLINIC | Age: 63
End: 2023-10-11
Payer: COMMERCIAL

## 2023-10-11 DIAGNOSIS — G57.02 LESION OF SCIATIC NERVE, LEFT LOWER LIMB: ICD-10-CM

## 2023-10-11 PROCEDURE — 77002 NEEDLE LOCALIZATION BY XRAY: CPT

## 2023-10-11 PROCEDURE — 64445 NJX AA&/STRD SCIATIC NRV IMG: CPT | Mod: LT

## 2023-10-11 PROCEDURE — J3490M: CUSTOM

## 2023-10-24 ENCOUNTER — APPOINTMENT (OUTPATIENT)
Dept: PAIN MANAGEMENT | Facility: CLINIC | Age: 63
End: 2023-10-24

## 2023-10-26 ENCOUNTER — APPOINTMENT (OUTPATIENT)
Dept: PAIN MANAGEMENT | Facility: CLINIC | Age: 63
End: 2023-10-26